# Patient Record
Sex: FEMALE | Race: BLACK OR AFRICAN AMERICAN | Employment: STUDENT | URBAN - METROPOLITAN AREA
[De-identification: names, ages, dates, MRNs, and addresses within clinical notes are randomized per-mention and may not be internally consistent; named-entity substitution may affect disease eponyms.]

---
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
## 2025-01-07 ENCOUNTER — HOSPITAL ENCOUNTER (OUTPATIENT)
Dept: INFUSION THERAPY | Age: 20
Discharge: HOME | End: 2025-01-07
Payer: COMMERCIAL

## 2025-01-07 ENCOUNTER — INITIAL CONSULT (OUTPATIENT)
Dept: ONCOLOGY | Age: 20
End: 2025-01-07

## 2025-01-07 VITALS
WEIGHT: 211.6 LBS | SYSTOLIC BLOOD PRESSURE: 121 MMHG | DIASTOLIC BLOOD PRESSURE: 74 MMHG | TEMPERATURE: 97.5 F | HEIGHT: 61 IN | HEART RATE: 89 BPM | OXYGEN SATURATION: 100 % | BODY MASS INDEX: 39.95 KG/M2

## 2025-01-07 DIAGNOSIS — D58.2 HEMOGLOBINOPATHY (HCC): ICD-10-CM

## 2025-01-07 DIAGNOSIS — D58.2 HEMOGLOBINOPATHY (HCC): Primary | ICD-10-CM

## 2025-01-07 LAB
BASOPHILS # BLD: 0.04 K/UL
BASOPHILS NFR BLD: 0 % (ref 0–1)
EOSINOPHIL # BLD: 0.13 K/UL
EOSINOPHILS RELATIVE PERCENT: 1 % (ref 0–3)
ERYTHROCYTE [DISTWIDTH] IN BLOOD BY AUTOMATED COUNT: 14 % (ref 11.7–14.9)
FERRITIN SERPL-MCNC: 15 NG/ML (ref 15–150)
FOLATE SERPL-MCNC: 15.7 NG/ML (ref 4.8–24.2)
HCT VFR BLD AUTO: 34.6 % (ref 37–47)
HGB BLD-MCNC: 11.8 G/DL (ref 12.5–16)
IRON SATN MFR SERPL: 13 % (ref 15–50)
IRON SERPL-MCNC: 51 UG/DL (ref 37–145)
LYMPHOCYTES NFR BLD: 1.9 K/UL
LYMPHOCYTES RELATIVE PERCENT: 17 % (ref 24–44)
MCH RBC QN AUTO: 29.1 PG (ref 27–31)
MCHC RBC AUTO-ENTMCNC: 34.1 G/DL (ref 32–36)
MCV RBC AUTO: 85.4 FL (ref 78–100)
MONOCYTES NFR BLD: 0.96 K/UL
MONOCYTES NFR BLD: 9 % (ref 0–4)
NEUTROPHILS NFR BLD: 72 % (ref 36–66)
NEUTS SEG NFR BLD: 7.87 K/UL
PLATELET # BLD AUTO: 256 K/UL (ref 140–440)
PMV BLD AUTO: 10.5 FL (ref 7.5–11.1)
RBC # BLD AUTO: 4.05 M/UL (ref 4.2–5.4)
TIBC SERPL-MCNC: 391 UG/DL (ref 260–445)
UNSATURATED IRON BINDING CAPACITY: 340 UG/DL (ref 110–370)
VIT B12 SERPL-MCNC: 314 PG/ML (ref 211–911)
WBC OTHER # BLD: 10.9 K/UL (ref 4–10.5)

## 2025-01-07 PROCEDURE — 85025 COMPLETE CBC W/AUTO DIFF WBC: CPT

## 2025-01-07 PROCEDURE — 83540 ASSAY OF IRON: CPT

## 2025-01-07 PROCEDURE — 82607 VITAMIN B-12: CPT

## 2025-01-07 PROCEDURE — 82728 ASSAY OF FERRITIN: CPT

## 2025-01-07 PROCEDURE — 83021 HEMOGLOBIN CHROMOTOGRAPHY: CPT

## 2025-01-07 PROCEDURE — 36415 COLL VENOUS BLD VENIPUNCTURE: CPT

## 2025-01-07 PROCEDURE — 85660 RBC SICKLE CELL TEST: CPT

## 2025-01-07 PROCEDURE — 99211 OFF/OP EST MAY X REQ PHY/QHP: CPT

## 2025-01-07 PROCEDURE — 82746 ASSAY OF FOLIC ACID SERUM: CPT

## 2025-01-07 PROCEDURE — 83550 IRON BINDING TEST: CPT

## 2025-01-07 ASSESSMENT — PATIENT HEALTH QUESTIONNAIRE - PHQ9
5. POOR APPETITE OR OVEREATING: NOT AT ALL
7. TROUBLE CONCENTRATING ON THINGS, SUCH AS READING THE NEWSPAPER OR WATCHING TELEVISION: NOT AT ALL
1. LITTLE INTEREST OR PLEASURE IN DOING THINGS: NOT AT ALL
4. FEELING TIRED OR HAVING LITTLE ENERGY: NOT AT ALL
SUM OF ALL RESPONSES TO PHQ QUESTIONS 1-9: 3
2. FEELING DOWN, DEPRESSED OR HOPELESS: NEARLY EVERY DAY
SUM OF ALL RESPONSES TO PHQ QUESTIONS 1-9: 3
SUM OF ALL RESPONSES TO PHQ9 QUESTIONS 1 & 2: 3
SUM OF ALL RESPONSES TO PHQ QUESTIONS 1-9: 3
SUM OF ALL RESPONSES TO PHQ QUESTIONS 1-9: 3
6. FEELING BAD ABOUT YOURSELF - OR THAT YOU ARE A FAILURE OR HAVE LET YOURSELF OR YOUR FAMILY DOWN: NOT AT ALL
10. IF YOU CHECKED OFF ANY PROBLEMS, HOW DIFFICULT HAVE THESE PROBLEMS MADE IT FOR YOU TO DO YOUR WORK, TAKE CARE OF THINGS AT HOME, OR GET ALONG WITH OTHER PEOPLE: NOT DIFFICULT AT ALL
9. THOUGHTS THAT YOU WOULD BE BETTER OFF DEAD, OR OF HURTING YOURSELF: NOT AT ALL
3. TROUBLE FALLING OR STAYING ASLEEP: NOT AT ALL
8. MOVING OR SPEAKING SO SLOWLY THAT OTHER PEOPLE COULD HAVE NOTICED. OR THE OPPOSITE, BEING SO FIGETY OR RESTLESS THAT YOU HAVE BEEN MOVING AROUND A LOT MORE THAN USUAL: NOT AT ALL

## 2025-01-07 NOTE — PROGRESS NOTES
MA Rooming Questions  Patient: Warren Briscoe  MRN: [id number]    Date: 1/7/2025        New Patient        5. Did the patient have a depression screening completed today? Yes    PHQ-9 Total Score: 3 (1/7/2025  2:33 PM)  Thoughts that you would be better off dead, or of hurting yourself in some way: 0 (1/7/2025  2:33 PM)       PHQ-9 Given to (if applicable):               PHQ-9 Score (if applicable):                     [] Positive     []  Negative              Does question #9 need addressed (if applicable)                     [] Yes    []  No               Enid Dukes MA

## 2025-01-11 LAB
HGB A1 MFR BLD: 55.5 % (ref 95–97.9)
HGB A2 MFR BLD: 3.3 % (ref 2–3.5)
HGB C MFR BLD: 0 % (ref 0–0)
HGB E MFR BLD: 0 % (ref 0–0)
HGB F MFR BLD: 1.1 % (ref 0–2.1)
HGB FRACT BLD ELPH-IMP: ABNORMAL
HGB OTHER MFR BLD: 0 % (ref 0–0)
HGB S BLD QL SOLY: POSITIVE
HGB S MFR BLD: 40.1 % (ref 0–0)
PATH INTERP BLD-IMP: ABNORMAL

## 2025-01-12 NOTE — PROGRESS NOTES
Patient Name:  Warren Briscoe  Patient :  2005  Patient MRN:  [id number]     Primary Oncologist: Jessica Trinh MD  Referring Provider: No primary care provider on file.     Date of Service: 2025       Chief Complaint:    No chief complaint on file.    FU visit.      There is no problem list on file for this patient.     HPI:   Luis Felipe HARRY is a female patient who was referred for abnormal Hg.  2024 HIV non reactive.   WBC 10, Hg 11.7, MCV 82.1, plat 306. HepBs Ab non reactive. Hep C not detected.   2024 WBC 9.5, Hg 11.7, MCV 87.5, plat 255.  Alpha thalassemia test negative.   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 Carriers of Sickle Cell Disease may be at risk for symptoms during physical exertion, high altitudes or when dehydrated. Comprehensive genetic counseling and additional medical workup as clinically indicated should be considered. If this individual's partner is a carrier for a Beta-Hemoglobinopathy or Beta-Thalassemia, their chance to have a child with this condition is 1 in 4 (25%). Carrier screening for this individual's partner is suggested.     Report boyfriend has been tested for sickle cell and he is negative. I recommend to ensure the boyfriend is also checked for thalassemia.     Currently pregnant. Due for delivery on 2025.   She has half brother, same mother.   No FH of cancer.     She has normocytic anemia. I order Hb electrophoresis, iron study and B-12 + folic acid.    2025 follow up visit.    2025 folate 15.7, B-12 314. WBC 10.9. Hg 11.8, MCV 85.4, plat 256.   ANC 7.87, Ferritin 15, TIBC 391, iron 51, sat 13.  HbA 55.5L, HbS 40.1, HbA1 3.3, consistent with sickle cell trait. .   Reports spouse negative for sickle cell disease.  Due date for delivery in 2025.  Advise to cont with prenatal vitamin and folic acid.    No acute pain.  Denies any

## 2025-01-14 ENCOUNTER — ROUTINE PRENATAL (OUTPATIENT)
Dept: OBGYN | Age: 20
End: 2025-01-14
Payer: COMMERCIAL

## 2025-01-14 VITALS
HEART RATE: 106 BPM | DIASTOLIC BLOOD PRESSURE: 61 MMHG | BODY MASS INDEX: 39.87 KG/M2 | WEIGHT: 211 LBS | SYSTOLIC BLOOD PRESSURE: 108 MMHG

## 2025-01-14 DIAGNOSIS — O09.93 SUPERVISION OF HIGH RISK PREGNANCY IN THIRD TRIMESTER: Primary | ICD-10-CM

## 2025-01-14 DIAGNOSIS — M54.9 BACK PAIN AFFECTING PREGNANCY IN THIRD TRIMESTER: ICD-10-CM

## 2025-01-14 DIAGNOSIS — K59.00 CONSTIPATION, UNSPECIFIED CONSTIPATION TYPE: ICD-10-CM

## 2025-01-14 DIAGNOSIS — O99.891 BACK PAIN AFFECTING PREGNANCY IN THIRD TRIMESTER: ICD-10-CM

## 2025-01-14 DIAGNOSIS — R10.2 PELVIC PAIN: ICD-10-CM

## 2025-01-14 DIAGNOSIS — R10.2 PELVIC PRESSURE IN FEMALE: ICD-10-CM

## 2025-01-14 PROCEDURE — 99213 OFFICE O/P EST LOW 20 MIN: CPT | Performed by: NURSE PRACTITIONER

## 2025-01-14 PROCEDURE — 1036F TOBACCO NON-USER: CPT | Performed by: NURSE PRACTITIONER

## 2025-01-14 PROCEDURE — G8427 DOCREV CUR MEDS BY ELIG CLIN: HCPCS | Performed by: NURSE PRACTITIONER

## 2025-01-14 PROCEDURE — G8417 CALC BMI ABV UP PARAM F/U: HCPCS | Performed by: NURSE PRACTITIONER

## 2025-01-14 SDOH — ECONOMIC STABILITY: FOOD INSECURITY: WITHIN THE PAST 12 MONTHS, THE FOOD YOU BOUGHT JUST DIDN'T LAST AND YOU DIDN'T HAVE MONEY TO GET MORE.: NEVER TRUE

## 2025-01-14 SDOH — ECONOMIC STABILITY: FOOD INSECURITY: WITHIN THE PAST 12 MONTHS, YOU WORRIED THAT YOUR FOOD WOULD RUN OUT BEFORE YOU GOT MONEY TO BUY MORE.: NEVER TRUE

## 2025-01-14 NOTE — PROGRESS NOTES
Return OB Office Visit    CC:   Chief Complaint   Patient presents with    Routine Prenatal Visit     Pt present for routine OB. Pt reports intense abdominal pain along w/ pressure in lower back, pt states lying down worsens sx. Pt advised white discharge, denies odor or dysuria. Pt also would like to have fetus re-measured for accuracy.  +fm, taking pnv.       HPI:  Patient seen and examined. Concerns/complaints: See above. Denies VB, LOF, ctx. +Fm.  Denies headaches, vision changes, RUQ pain, increased LE edema.  Denies chest pain, shortness of breath, fever, chills, nausea, vomiting.      Review of Systems: The following ROS was otherwise negative, except as noted in the HPI: constitutional, HEENT, respiratory, cardiovascular, gastrointestinal, genitourinary, skin, musculoskeletal, neurological, psych    Objective:  /61   Pulse (!) 106   Wt 95.7 kg (211 lb)   LMP 2024   BMI 39.87 kg/m²     Physical Exam  Vitals and nursing note reviewed.   Constitutional:       Appearance: Normal appearance. She is obese.   HENT:      Head: Normocephalic.   Pulmonary:      Effort: Pulmonary effort is normal.   Musculoskeletal:         General: Normal range of motion.      Cervical back: Normal range of motion.   Skin:     General: Skin is warm and dry.   Neurological:      Mental Status: She is alert.   Psychiatric:         Mood and Affect: Mood normal.         Gravid, non tender, no flank pain    FHR: + by doppler    Assessment/Plan:   Warren Briscoe is a 19 y.o.  at 28w4d who presents for routine OB visit     Diagnosis Orders   1. Supervision of high risk pregnancy in third trimester        2. Pelvic pressure in female        3. Pelvic pain        4. Back pain affecting pregnancy in third trimester        5. Constipation, unspecified constipation type          Safe medications for constipation discussed, encouraged to increase water/fiber intake    Declines flu/TDAP vaccines  All labs up to date and

## 2025-01-15 LAB — BACTERIA UR CULT: NORMAL

## 2025-01-20 ENCOUNTER — OFFICE VISIT (OUTPATIENT)
Dept: ONCOLOGY | Age: 20
End: 2025-01-20

## 2025-01-20 ENCOUNTER — HOSPITAL ENCOUNTER (OUTPATIENT)
Dept: INFUSION THERAPY | Age: 20
Discharge: HOME | End: 2025-01-20
Payer: COMMERCIAL

## 2025-01-20 VITALS
HEIGHT: 61 IN | DIASTOLIC BLOOD PRESSURE: 71 MMHG | BODY MASS INDEX: 40.33 KG/M2 | SYSTOLIC BLOOD PRESSURE: 120 MMHG | HEART RATE: 87 BPM | WEIGHT: 213.6 LBS | TEMPERATURE: 98 F | OXYGEN SATURATION: 100 %

## 2025-01-20 DIAGNOSIS — D58.2 HEMOGLOBINOPATHY (HCC): Primary | ICD-10-CM

## 2025-01-20 PROCEDURE — 99211 OFF/OP EST MAY X REQ PHY/QHP: CPT

## 2025-01-20 NOTE — PROGRESS NOTES
MA Rooming Questions  Patient: Warren Briscoe  MRN: [id number]    Date: 1/20/2025        1. Do you have any new issues?   no         2. Do you need any refills on medications?    no    3. Have you had any imaging done since your last visit?   no    4. Have you been hospitalized or seen in the emergency room since your last visit here?   no    5. Did the patient have a depression screening completed today? No    No data recorded     PHQ-9 Given to (if applicable):               PHQ-9 Score (if applicable):                     [] Positive     []  Negative              Does question #9 need addressed (if applicable)                     [] Yes    []  No               Courtney Carcamo [facility]

## 2025-01-28 ENCOUNTER — ROUTINE PRENATAL (OUTPATIENT)
Dept: OBGYN | Age: 20
End: 2025-01-28
Payer: COMMERCIAL

## 2025-01-28 VITALS
SYSTOLIC BLOOD PRESSURE: 87 MMHG | DIASTOLIC BLOOD PRESSURE: 57 MMHG | BODY MASS INDEX: 40.4 KG/M2 | WEIGHT: 214 LBS | HEART RATE: 107 BPM | HEIGHT: 61 IN

## 2025-01-28 DIAGNOSIS — O99.213 OBESITY AFFECTING PREGNANCY IN THIRD TRIMESTER, UNSPECIFIED OBESITY TYPE: ICD-10-CM

## 2025-01-28 DIAGNOSIS — O09.93 SUPERVISION OF HIGH RISK PREGNANCY IN THIRD TRIMESTER: Primary | ICD-10-CM

## 2025-01-28 DIAGNOSIS — Z3A.30 30 WEEKS GESTATION OF PREGNANCY: ICD-10-CM

## 2025-01-28 PROCEDURE — G8417 CALC BMI ABV UP PARAM F/U: HCPCS | Performed by: OBSTETRICS & GYNECOLOGY

## 2025-01-28 PROCEDURE — G8427 DOCREV CUR MEDS BY ELIG CLIN: HCPCS | Performed by: OBSTETRICS & GYNECOLOGY

## 2025-01-28 PROCEDURE — 1036F TOBACCO NON-USER: CPT | Performed by: OBSTETRICS & GYNECOLOGY

## 2025-01-28 PROCEDURE — 99213 OFFICE O/P EST LOW 20 MIN: CPT | Performed by: OBSTETRICS & GYNECOLOGY

## 2025-01-28 NOTE — PROGRESS NOTES
Return OB Office Visit    CC:   Chief Complaint   Patient presents with    Routine Prenatal Visit     Taking pnv and flexeril. +fm. Pt has questions regarding size of fetus.        HPI:  Patient seen and examined. No concerns/complaints. Denies VB, LOF, ctx. +Fm.  Denies headaches, vision changes, RUQ pain, increased LE edema.  Denies chest pain, shortness of breath, fever, chills, nausea, vomiting.      Review of Systems: The following ROS was otherwise negative, except as noted in the HPI: constitutional, HEENT, respiratory, cardiovascular, gastrointestinal, genitourinary, skin, musculoskeletal, neurological, psych    Objective:  BP (!) 87/57   Pulse (!) 107   Ht 1.549 m (5' 1\")   Wt 97.1 kg (214 lb)   LMP 2024   BMI 40.43 kg/m²     Gravid, non tender, no flank pain    FHR: +by doppler    Assessment/Plan:   Warren Briscoe is a 19 y.o.  at 30w4d who presents for routine OB visit     Diagnosis Orders   1. Supervision of high risk pregnancy in third trimester        2. Obesity affecting pregnancy in third trimester, unspecified obesity type        3. 30 weeks gestation of pregnancy            Return in about 2 weeks (around 2025).    All OB labs and imaging reviewed  Vitamins for the duration of pregnancy      Bob Camacho MD

## 2025-02-11 ENCOUNTER — ROUTINE PRENATAL (OUTPATIENT)
Dept: OBGYN | Age: 20
End: 2025-02-11
Payer: COMMERCIAL

## 2025-02-11 VITALS
BODY MASS INDEX: 41.76 KG/M2 | SYSTOLIC BLOOD PRESSURE: 113 MMHG | HEART RATE: 82 BPM | DIASTOLIC BLOOD PRESSURE: 75 MMHG | WEIGHT: 221 LBS

## 2025-02-11 DIAGNOSIS — Z3A.32 32 WEEKS GESTATION OF PREGNANCY: Primary | ICD-10-CM

## 2025-02-11 DIAGNOSIS — O99.210 MATERNAL OBESITY, ANTEPARTUM: ICD-10-CM

## 2025-02-11 DIAGNOSIS — O26.893 LOW BACK PAIN DURING PREGNANCY IN THIRD TRIMESTER: ICD-10-CM

## 2025-02-11 DIAGNOSIS — O09.93 SUPERVISION OF HIGH RISK PREGNANCY IN THIRD TRIMESTER: ICD-10-CM

## 2025-02-11 DIAGNOSIS — M54.50 LOW BACK PAIN DURING PREGNANCY IN THIRD TRIMESTER: ICD-10-CM

## 2025-02-11 PROCEDURE — 1036F TOBACCO NON-USER: CPT

## 2025-02-11 PROCEDURE — G8427 DOCREV CUR MEDS BY ELIG CLIN: HCPCS

## 2025-02-11 PROCEDURE — G8417 CALC BMI ABV UP PARAM F/U: HCPCS

## 2025-02-11 PROCEDURE — 99214 OFFICE O/P EST MOD 30 MIN: CPT

## 2025-02-11 RX ORDER — LIDOCAINE 4 G/G
1 PATCH TOPICAL DAILY
Qty: 30 PATCH | Refills: 0 | Status: SHIPPED | OUTPATIENT
Start: 2025-02-11 | End: 2025-03-13

## 2025-02-11 NOTE — PROGRESS NOTES
Return OB Office Visit    CC:   Chief Complaint   Patient presents with    Routine Prenatal Visit     Pt c/o lower back pain radiating down to legs, pelvic pressure and cramps w/ white discharge. Denies odor and blood. +fm ,taking pnv.        HPI:  Patient seen and examined. C/o low back pain as above. C/o mild menstrual like cramps. Denies VB, LOF, ctx. +Fm.  C/o headaches, but they improve with hydration and tylenol. Denies vision changes, RUQ pain, increased LE edema.  Denies chest pain, shortness of breath, fever, chills, nausea, vomiting.      Review of Systems: The following ROS was otherwise negative, except as noted in the HPI: constitutional, HEENT, respiratory, cardiovascular, gastrointestinal, genitourinary, skin, musculoskeletal, neurological, psych    Objective:  /75   Pulse 82   Wt 100.2 kg (221 lb)   LMP 2024   BMI 41.76 kg/m²     Physical Exam  Vitals and nursing note reviewed.   Constitutional:       Appearance: Normal appearance.   HENT:      Head: Normocephalic.   Pulmonary:      Effort: Pulmonary effort is normal.   Musculoskeletal:         General: Normal range of motion.      Cervical back: Normal range of motion.   Skin:     General: Skin is warm and dry.   Neurological:      Mental Status: She is alert.   Psychiatric:         Mood and Affect: Mood normal.       Gravid, non tender, no flank pain    FHR: + by doppler    Assessment/Plan:   Warren Briscoe is a 19 y.o.  at 32w4d who presents for routine OB visit     Diagnosis Orders   1. 32 weeks gestation of pregnancy        2. Supervision of high risk pregnancy in third trimester  US PREG UTERUS FOLLOW UP TRANSABD PER FETUS      3. Maternal obesity, antepartum        4. Low back pain during pregnancy in third trimester  lidocaine 4 % external patch        Fundal height 34 today, will follow up with ultrasound. 1 hour gtt normal    Continue PNV. Fetal kick counts reviewed. PTL s/s reviewed. To L&D for decreased fm, gush of

## 2025-02-25 ENCOUNTER — ROUTINE PRENATAL (OUTPATIENT)
Dept: OBGYN | Age: 20
End: 2025-02-25
Payer: COMMERCIAL

## 2025-02-25 VITALS
WEIGHT: 234 LBS | DIASTOLIC BLOOD PRESSURE: 72 MMHG | BODY MASS INDEX: 44.21 KG/M2 | SYSTOLIC BLOOD PRESSURE: 119 MMHG | HEART RATE: 75 BPM

## 2025-02-25 DIAGNOSIS — O09.93 SUPERVISION OF HIGH RISK PREGNANCY IN THIRD TRIMESTER: Primary | ICD-10-CM

## 2025-02-25 DIAGNOSIS — Z3A.34 34 WEEKS GESTATION OF PREGNANCY: ICD-10-CM

## 2025-02-25 DIAGNOSIS — O26.893 PREGNANCY HEADACHE IN THIRD TRIMESTER: ICD-10-CM

## 2025-02-25 DIAGNOSIS — F41.9 ANXIETY AND DEPRESSION: ICD-10-CM

## 2025-02-25 DIAGNOSIS — F32.A ANXIETY AND DEPRESSION: ICD-10-CM

## 2025-02-25 DIAGNOSIS — R60.0 LOWER EXTREMITY EDEMA: ICD-10-CM

## 2025-02-25 DIAGNOSIS — R42 DIZZINESS: ICD-10-CM

## 2025-02-25 DIAGNOSIS — R51.9 PREGNANCY HEADACHE IN THIRD TRIMESTER: ICD-10-CM

## 2025-02-25 DIAGNOSIS — O99.210 OBESITY IN PREGNANCY: ICD-10-CM

## 2025-02-25 PROCEDURE — 36415 COLL VENOUS BLD VENIPUNCTURE: CPT | Performed by: STUDENT IN AN ORGANIZED HEALTH CARE EDUCATION/TRAINING PROGRAM

## 2025-02-25 PROCEDURE — 99213 OFFICE O/P EST LOW 20 MIN: CPT | Performed by: STUDENT IN AN ORGANIZED HEALTH CARE EDUCATION/TRAINING PROGRAM

## 2025-02-25 PROCEDURE — G8417 CALC BMI ABV UP PARAM F/U: HCPCS | Performed by: STUDENT IN AN ORGANIZED HEALTH CARE EDUCATION/TRAINING PROGRAM

## 2025-02-25 PROCEDURE — G8427 DOCREV CUR MEDS BY ELIG CLIN: HCPCS | Performed by: STUDENT IN AN ORGANIZED HEALTH CARE EDUCATION/TRAINING PROGRAM

## 2025-02-25 PROCEDURE — 1036F TOBACCO NON-USER: CPT | Performed by: STUDENT IN AN ORGANIZED HEALTH CARE EDUCATION/TRAINING PROGRAM

## 2025-02-25 NOTE — PROGRESS NOTES
Department of Obstetrics and Gynecology  Routine Prenatal Office Visit  Name:  Warren Briscoe   CSN: [id number]    : 2005   Age: 19 y.o.        Chief Complaint   Patient presents with    Routine Prenatal Visit     +fm, taking pnv  C/o LE edema with painful ambulation, dizziness accompanied with headaches and spotty / blurry vision       EDC: Estimated Date of Delivery: 25     Warren Briscoe is a 19 y.o.  at 34w4d     Subjective : Patient reports lower extremity edema. Notes dizziness and headaches with spotty vision.     + Fetal movement.     Negative headache, negative vision changes, negative right upper quadrant pain, negative edema, positive fetal movement, negative contractions, negative vaginal bleeding, negative leakage of fluid. Pt denies nausea/vomiting and calf pain.    Objective :    /72   Pulse 75   Wt 106.1 kg (234 lb)   LMP 2024   BMI 44.21 kg/m²         Physical Exam:  General Appearance: Alert and oriented. No acute distress  Gastrointestinal: Soft. Non-tender, non-distended. Gravid uterus.  Musculoskeletal: No significant lower extremity edema.    Fundal Height: 35 cm  Fetal Heart Tones: 154 bpm    ASSESSMENT/PLAN:     1. Warren Briscoe is a 19 y.o.  at 34w4d     Diagnosis Orders   1. Supervision of high risk pregnancy in third trimester        2. 34 weeks gestation of pregnancy        3. Obesity in pregnancy        4. Anxiety and depression        5. Dizziness        6. Lower extremity edema  Protein / creatinine ratio, urine    Comprehensive Metabolic Panel    CBC with Auto Differential      7. Pregnancy headache in third trimester  Protein / creatinine ratio, urine    Comprehensive Metabolic Panel    CBC with Auto Differential           All OB labs and imaging reviewed  Recommend continuing and taking daily prenatal vitamin  Tylenol for episodic pain relief as needed  GBS next visit  BP normotensive today, will get labs. Recommend compression

## 2025-02-26 LAB
ALBUMIN SERPL-MCNC: 3.2 G/DL (ref 3.4–5)
ALBUMIN/GLOB SERPL: 1.3 {RATIO} (ref 1.1–2.2)
ALP SERPL-CCNC: 103 U/L (ref 40–129)
ALT SERPL-CCNC: 9 U/L (ref 10–40)
ANION GAP SERPL CALCULATED.3IONS-SCNC: 9 MMOL/L (ref 3–16)
AST SERPL-CCNC: 15 U/L (ref 15–37)
BASOPHILS # BLD: 0.1 K/UL (ref 0–0.2)
BASOPHILS NFR BLD: 0.5 %
BILIRUB SERPL-MCNC: 0.5 MG/DL (ref 0–1)
BUN SERPL-MCNC: 3 MG/DL (ref 7–20)
CALCIUM SERPL-MCNC: 9 MG/DL (ref 8.3–10.6)
CHLORIDE SERPL-SCNC: 108 MMOL/L (ref 99–110)
CO2 SERPL-SCNC: 23 MMOL/L (ref 21–32)
CREAT SERPL-MCNC: 0.6 MG/DL (ref 0.6–1.1)
CREAT UR-MCNC: 105 MG/DL (ref 28–259)
DEPRECATED RDW RBC AUTO: 14.4 % (ref 12.4–15.4)
EOSINOPHIL # BLD: 0.1 K/UL (ref 0–0.6)
EOSINOPHIL NFR BLD: 1.4 %
GFR SERPLBLD CREATININE-BSD FMLA CKD-EPI: >90 ML/MIN/{1.73_M2}
GLUCOSE SERPL-MCNC: 66 MG/DL (ref 70–99)
HCT VFR BLD AUTO: 36.4 % (ref 36–48)
HGB BLD-MCNC: 12.3 G/DL (ref 12–16)
LYMPHOCYTES # BLD: 1.7 K/UL (ref 1–5.1)
LYMPHOCYTES NFR BLD: 16.5 %
MCH RBC QN AUTO: 28.5 PG (ref 26–34)
MCHC RBC AUTO-ENTMCNC: 33.9 G/DL (ref 31–36)
MCV RBC AUTO: 84.2 FL (ref 80–100)
MONOCYTES # BLD: 0.8 K/UL (ref 0–1.3)
MONOCYTES NFR BLD: 8 %
NEUTROPHILS # BLD: 7.7 K/UL (ref 1.7–7.7)
NEUTROPHILS NFR BLD: 73.6 %
PLATELET # BLD AUTO: 251 K/UL (ref 135–450)
PMV BLD AUTO: 10.3 FL (ref 5–10.5)
POTASSIUM SERPL-SCNC: 4.6 MMOL/L (ref 3.5–5.1)
PROT SERPL-MCNC: 5.6 G/DL (ref 6.4–8.2)
PROT UR-MCNC: 10.9 MG/DL
PROT/CREAT UR-RTO: 0.1 MG/DL
RBC # BLD AUTO: 4.32 M/UL (ref 4–5.2)
SODIUM SERPL-SCNC: 140 MMOL/L (ref 136–145)
WBC # BLD AUTO: 10.4 K/UL (ref 4–11)

## 2025-03-06 ENCOUNTER — HOSPITAL ENCOUNTER (OUTPATIENT)
Age: 20
Discharge: HOME | End: 2025-03-06
Attending: OBSTETRICS & GYNECOLOGY | Admitting: OBSTETRICS & GYNECOLOGY
Payer: COMMERCIAL

## 2025-03-06 VITALS
HEIGHT: 61 IN | BODY MASS INDEX: 44.37 KG/M2 | WEIGHT: 235 LBS | RESPIRATION RATE: 15 BRPM | TEMPERATURE: 98.1 F | OXYGEN SATURATION: 100 % | SYSTOLIC BLOOD PRESSURE: 120 MMHG | DIASTOLIC BLOOD PRESSURE: 74 MMHG | HEART RATE: 74 BPM

## 2025-03-06 PROBLEM — O36.8190 DECREASED FETAL MOVEMENT AFFECTING MANAGEMENT OF MOTHER, ANTEPARTUM: Status: ACTIVE | Noted: 2025-03-06

## 2025-03-06 PROCEDURE — 99212 OFFICE O/P EST SF 10 MIN: CPT

## 2025-03-06 PROCEDURE — APPNB30 APP NON BILLABLE TIME 0-30 MINS: Performed by: NURSE PRACTITIONER

## 2025-03-06 NOTE — FLOWSHEET NOTE
Patient arrives ambulatory to the birthing center with complaints of decreased fetal movement. Patient escorted to [facility], instructed to change into gown, obtain a urine specimen, and oriented to room and call light.

## 2025-03-06 NOTE — PROGRESS NOTES
Department of Obstetrics and Gynecology  Labor and Delivery  TRIAGE NOTE      SUBJECTIVE:    Chief Complaint   Patient presents with    Decreased Fetal Movement     Pt presents to L and D with complaints of decreased fetal movement hasn't felt baby move for \" 3 days \".  States \" if it does move it is small\".  No other concerns, denies LOF or bleeding.      OBJECTIVE    Vitals:  /86   Pulse 72   Resp 16   Ht 1.549 m (5' 1\")   Wt 106.6 kg (235 lb)   LMP 2024   SpO2 99%   BMI 44.40 kg/m²       CONSTITUTIONAL:  negative  RESPIRATORY:  negative  CARDIOVASCULAR:  negative  GASTROINTESTINAL:  negative  ALLERGIC/IMMUNOLOGIC:  negative  NEUROLOGICAL:  negative  BEHAVIOR/PSYCH:  negative      Fetal heart rate:        Baseline FHR :    130 bpm              Fetal Accelerations:  present        Fetal Decelerations:  absent        Fetal Variability:   moderate    Contraction frequency:  0 minutes      DATA:  Lab Results   Component Value Date    WBC 10.4 2025    HGB 12.3 2025    HCT 36.4 2025    MCV 84.2 2025     2025         ASSESSMENT:     19 y.o.  year old  at 35w6d  with 2025, by Last Menstrual Period  FHT CAT 1 , reactive NST         PLAN:  Kick counts discussed , reviewed when to call with concern   Kepp follow up OB appt as scheduled     I have collaborated and updated Dr Camacho  and the MD agrees with the current POC.       EDER Moreno CNM

## 2025-03-06 NOTE — FLOWSHEET NOTE
EFM and TOCO applied.     Patient reports that she has not felt baby move for 3 days. Patient denies loss of fluids, denies vaginal bleeding, and denies contractions. Patient's abdomen is soft and non tender.

## 2025-03-11 ENCOUNTER — ROUTINE PRENATAL (OUTPATIENT)
Dept: OBGYN | Age: 20
End: 2025-03-11
Payer: COMMERCIAL

## 2025-03-11 VITALS
DIASTOLIC BLOOD PRESSURE: 84 MMHG | BODY MASS INDEX: 44.97 KG/M2 | HEART RATE: 81 BPM | WEIGHT: 238 LBS | SYSTOLIC BLOOD PRESSURE: 123 MMHG

## 2025-03-11 DIAGNOSIS — Z3A.36 36 WEEKS GESTATION OF PREGNANCY: Primary | ICD-10-CM

## 2025-03-11 DIAGNOSIS — F32.A ANXIETY AND DEPRESSION: ICD-10-CM

## 2025-03-11 DIAGNOSIS — Z34.03 ENCOUNTER FOR SUPERVISION OF NORMAL FIRST PREGNANCY IN THIRD TRIMESTER: ICD-10-CM

## 2025-03-11 DIAGNOSIS — F41.9 ANXIETY AND DEPRESSION: ICD-10-CM

## 2025-03-11 DIAGNOSIS — O99.210 OBESITY IN PREGNANCY: ICD-10-CM

## 2025-03-11 PROBLEM — O36.8190 DECREASED FETAL MOVEMENT AFFECTING MANAGEMENT OF MOTHER, ANTEPARTUM: Status: RESOLVED | Noted: 2025-03-06 | Resolved: 2025-03-11

## 2025-03-11 PROCEDURE — G8417 CALC BMI ABV UP PARAM F/U: HCPCS | Performed by: STUDENT IN AN ORGANIZED HEALTH CARE EDUCATION/TRAINING PROGRAM

## 2025-03-11 PROCEDURE — 1036F TOBACCO NON-USER: CPT | Performed by: STUDENT IN AN ORGANIZED HEALTH CARE EDUCATION/TRAINING PROGRAM

## 2025-03-11 PROCEDURE — 99213 OFFICE O/P EST LOW 20 MIN: CPT | Performed by: STUDENT IN AN ORGANIZED HEALTH CARE EDUCATION/TRAINING PROGRAM

## 2025-03-11 PROCEDURE — G8427 DOCREV CUR MEDS BY ELIG CLIN: HCPCS | Performed by: STUDENT IN AN ORGANIZED HEALTH CARE EDUCATION/TRAINING PROGRAM

## 2025-03-11 NOTE — PROGRESS NOTES
Department of Obstetrics and Gynecology  Routine Prenatal Office Visit  Name:  Warren Briscoe   CSN: [id number]    : 2005   Age: 19 y.o.        Chief Complaint   Patient presents with    Routine Prenatal Visit     GBS today, no c/o. +fm, taking pnv.        EDC: Estimated Date of Delivery: 25     Warren Briscoe is a 19 y.o.  at 36w4d     Subjective : Patient doing well without complaints.    + Fetal movement.     Negative headache, negative vision changes, negative right upper quadrant pain, negative edema, positive fetal movement, negative contractions, negative vaginal bleeding, negative leakage of fluid. Pt denies nausea/vomiting and calf pain.    Objective :    /84   Pulse 81   Wt 108 kg (238 lb)   LMP 2024   BMI 44.97 kg/m²         Physical Exam:  General Appearance: Alert and oriented. No acute distress  Gastrointestinal: Soft. Non-tender, non-distended. Gravid uterus.  Musculoskeletal: No significant lower extremity edema.    Fundal Height: 36 cm  Fetal Heart Tones: 145 bpm    Chaperone: Mine Cordon MA present for exam    ASSESSMENT/PLAN:     1. Warren Briscoe is a 19 y.o.  at 36w4d     Diagnosis Orders   1. 36 weeks gestation of pregnancy  Culture, Strep B Screen, Vaginal/Rectal      2. Encounter for supervision of normal first pregnancy in third trimester  Culture, Strep B Screen, Vaginal/Rectal      3. Obesity in pregnancy        4. Anxiety and depression        5. Breech presentation, single or unspecified fetus  US OB 1 OR MORE FETUS LIMITED           All OB labs and imaging reviewed  Recommend continuing and taking daily prenatal vitamin  Tylenol for episodic pain relief as needed  GBS today  Breech on prior US, repeat scan ordered to check position in 1w    Return to the office in 1 week    Electronically signed by: Meryl Arceo DO 3/11/2025

## 2025-03-14 LAB — GP B STREP SPEC QL CULT: NORMAL

## 2025-03-17 ENCOUNTER — ANESTHESIA EVENT (OUTPATIENT)
Dept: LABOR AND DELIVERY | Age: 20
DRG: 540 | End: 2025-03-17
Payer: COMMERCIAL

## 2025-03-17 ENCOUNTER — ANESTHESIA (OUTPATIENT)
Dept: LABOR AND DELIVERY | Age: 20
DRG: 540 | End: 2025-03-17
Payer: COMMERCIAL

## 2025-03-17 ENCOUNTER — HOSPITAL ENCOUNTER (INPATIENT)
Age: 20
LOS: 3 days | Discharge: HOME | DRG: 540 | End: 2025-03-20
Attending: OBSTETRICS & GYNECOLOGY | Admitting: STUDENT IN AN ORGANIZED HEALTH CARE EDUCATION/TRAINING PROGRAM
Payer: COMMERCIAL

## 2025-03-17 PROBLEM — Z3A.37 37 WEEKS GESTATION OF PREGNANCY: Status: ACTIVE | Noted: 2025-03-17

## 2025-03-17 PROBLEM — O42.90 AMNIOTIC FLUID LEAKING: Status: ACTIVE | Noted: 2025-03-17

## 2025-03-17 LAB
ABO + RH BLD: NORMAL
ALBUMIN SERPL-MCNC: 2.8 G/DL (ref 3.4–5)
ALBUMIN/GLOB SERPL: 1.3 {RATIO} (ref 1.1–2.2)
ALP SERPL-CCNC: 113 U/L (ref 40–129)
ALT SERPL-CCNC: 6 U/L (ref 10–40)
AMPHET UR QL SCN: NEGATIVE
ANION GAP SERPL CALCULATED.3IONS-SCNC: 9 MMOL/L (ref 9–17)
AST SERPL-CCNC: 13 U/L (ref 15–37)
BARBITURATES UR QL SCN: NEGATIVE
BENZODIAZ UR QL: NEGATIVE
BILIRUB SERPL-MCNC: 0.4 MG/DL (ref 0–1)
BILIRUB UR QL STRIP: NEGATIVE
BLOOD BANK SAMPLE EXPIRATION: NORMAL
BLOOD GROUP ANTIBODIES SERPL: NEGATIVE
BUN SERPL-MCNC: 6 MG/DL (ref 7–20)
CALCIUM SERPL-MCNC: 8.4 MG/DL (ref 8.3–10.6)
CANNABINOIDS UR QL SCN: NEGATIVE
CHLORIDE SERPL-SCNC: 111 MMOL/L (ref 99–110)
CLARITY UR: CLEAR
CO2 SERPL-SCNC: 19 MMOL/L (ref 21–32)
COCAINE UR QL SCN: NEGATIVE
COLOR UR: YELLOW
CREAT SERPL-MCNC: 0.6 MG/DL (ref 0.6–1.1)
CREAT UR-MCNC: 123 MG/DL (ref 28–217)
EPI CELLS #/AREA URNS HPF: 14 /HPF
ERYTHROCYTE [DISTWIDTH] IN BLOOD BY AUTOMATED COUNT: 14 % (ref 11.7–14.9)
FENTANYL UR QL: NEGATIVE
GFR, ESTIMATED: >90 ML/MIN/1.73M2
GLUCOSE SERPL-MCNC: 72 MG/DL (ref 74–99)
GLUCOSE UR STRIP-MCNC: NEGATIVE MG/DL
HCT VFR BLD AUTO: 35.8 % (ref 37–47)
HGB BLD-MCNC: 11.6 G/DL (ref 12.5–16)
HGB UR QL STRIP.AUTO: ABNORMAL
KETONES UR STRIP-MCNC: NEGATIVE MG/DL
LEUKOCYTE ESTERASE UR QL STRIP: NEGATIVE
MCH RBC QN AUTO: 27.6 PG (ref 27–31)
MCHC RBC AUTO-ENTMCNC: 32.4 G/DL (ref 32–36)
MCV RBC AUTO: 85 FL (ref 78–100)
NITRITE UR QL STRIP: NEGATIVE
OPIATES UR QL SCN: NEGATIVE
OXYCODONE UR QL SCN: NEGATIVE
PH UR STRIP: 7 [PH] (ref 5–8)
PLATELET # BLD AUTO: 210 K/UL (ref 140–440)
PMV BLD AUTO: 11.7 FL (ref 7.5–11.1)
POTASSIUM SERPL-SCNC: 4.6 MMOL/L (ref 3.5–5.1)
PROT SERPL-MCNC: 5.1 G/DL (ref 6.4–8.2)
PROT UR STRIP-MCNC: ABNORMAL MG/DL
RBC # BLD AUTO: 4.21 M/UL (ref 4.2–5.4)
RBC #/AREA URNS HPF: 52 /HPF (ref 0–2)
SODIUM SERPL-SCNC: 140 MMOL/L (ref 136–145)
SP GR UR STRIP: 1.01 (ref 1–1.03)
T PALLIDUM AB SER QL IA: NONREACTIVE
TEST INFORMATION: NORMAL
TOTAL PROTEIN, URINE: 47 MG/DL
URINE TOTAL PROTEIN CREATININE RATIO: 0.38 (ref 0–0.2)
UROBILINOGEN UR STRIP-ACNC: 0.2 EU/DL (ref 0–1)
WBC #/AREA URNS HPF: 5 /HPF (ref 0–5)
WBC OTHER # BLD: 9 K/UL (ref 4–10.5)

## 2025-03-17 PROCEDURE — 59514 CESAREAN DELIVERY ONLY: CPT | Performed by: STUDENT IN AN ORGANIZED HEALTH CARE EDUCATION/TRAINING PROGRAM

## 2025-03-17 PROCEDURE — 86900 BLOOD TYPING SEROLOGIC ABO: CPT

## 2025-03-17 PROCEDURE — 2709999900 HC NON-CHARGEABLE SUPPLY: Performed by: STUDENT IN AN ORGANIZED HEALTH CARE EDUCATION/TRAINING PROGRAM

## 2025-03-17 PROCEDURE — 86780 TREPONEMA PALLIDUM: CPT

## 2025-03-17 PROCEDURE — APPNB30 APP NON BILLABLE TIME 0-30 MINS: Performed by: ADVANCED PRACTICE MIDWIFE

## 2025-03-17 PROCEDURE — 6360000002 HC RX W HCPCS: Performed by: ADVANCED PRACTICE MIDWIFE

## 2025-03-17 PROCEDURE — 84156 ASSAY OF PROTEIN URINE: CPT

## 2025-03-17 PROCEDURE — 7100000000 HC PACU RECOVERY - FIRST 15 MIN: Performed by: STUDENT IN AN ORGANIZED HEALTH CARE EDUCATION/TRAINING PROGRAM

## 2025-03-17 PROCEDURE — 86901 BLOOD TYPING SEROLOGIC RH(D): CPT

## 2025-03-17 PROCEDURE — 2500000003 HC RX 250 WO HCPCS: Performed by: ADVANCED PRACTICE MIDWIFE

## 2025-03-17 PROCEDURE — 7100000001 HC PACU RECOVERY - ADDTL 15 MIN: Performed by: STUDENT IN AN ORGANIZED HEALTH CARE EDUCATION/TRAINING PROGRAM

## 2025-03-17 PROCEDURE — 81001 URINALYSIS AUTO W/SCOPE: CPT

## 2025-03-17 PROCEDURE — 36415 COLL VENOUS BLD VENIPUNCTURE: CPT

## 2025-03-17 PROCEDURE — 2580000003 HC RX 258: Performed by: ADVANCED PRACTICE MIDWIFE

## 2025-03-17 PROCEDURE — 82570 ASSAY OF URINE CREATININE: CPT

## 2025-03-17 PROCEDURE — 86850 RBC ANTIBODY SCREEN: CPT

## 2025-03-17 PROCEDURE — 80307 DRUG TEST PRSMV CHEM ANLYZR: CPT

## 2025-03-17 PROCEDURE — 1220000000 HC SEMI PRIVATE OB R&B

## 2025-03-17 PROCEDURE — 3700000001 HC ADD 15 MINUTES (ANESTHESIA): Performed by: STUDENT IN AN ORGANIZED HEALTH CARE EDUCATION/TRAINING PROGRAM

## 2025-03-17 PROCEDURE — 6360000002 HC RX W HCPCS: Performed by: NURSE ANESTHETIST, CERTIFIED REGISTERED

## 2025-03-17 PROCEDURE — 6360000002 HC RX W HCPCS: Performed by: STUDENT IN AN ORGANIZED HEALTH CARE EDUCATION/TRAINING PROGRAM

## 2025-03-17 PROCEDURE — 80053 COMPREHEN METABOLIC PANEL: CPT

## 2025-03-17 PROCEDURE — 3609079900 HC CESAREAN SECTION: Performed by: STUDENT IN AN ORGANIZED HEALTH CARE EDUCATION/TRAINING PROGRAM

## 2025-03-17 PROCEDURE — 6370000000 HC RX 637 (ALT 250 FOR IP): Performed by: ADVANCED PRACTICE MIDWIFE

## 2025-03-17 PROCEDURE — 3700000000 HC ANESTHESIA ATTENDED CARE: Performed by: STUDENT IN AN ORGANIZED HEALTH CARE EDUCATION/TRAINING PROGRAM

## 2025-03-17 PROCEDURE — 85027 COMPLETE CBC AUTOMATED: CPT

## 2025-03-17 RX ORDER — SODIUM CHLORIDE, SODIUM LACTATE, POTASSIUM CHLORIDE, CALCIUM CHLORIDE 600; 310; 30; 20 MG/100ML; MG/100ML; MG/100ML; MG/100ML
INJECTION, SOLUTION INTRAVENOUS CONTINUOUS
Status: DISCONTINUED | OUTPATIENT
Start: 2025-03-17 | End: 2025-03-17 | Stop reason: SDUPTHER

## 2025-03-17 RX ORDER — SODIUM CHLORIDE 0.9 % (FLUSH) 0.9 %
10 SYRINGE (ML) INJECTION EVERY 12 HOURS SCHEDULED
Status: DISCONTINUED | OUTPATIENT
Start: 2025-03-17 | End: 2025-03-17

## 2025-03-17 RX ORDER — FAMOTIDINE 10 MG/ML
20 INJECTION, SOLUTION INTRAVENOUS 2 TIMES DAILY PRN
Status: DISCONTINUED | OUTPATIENT
Start: 2025-03-17 | End: 2025-03-17

## 2025-03-17 RX ORDER — NALOXONE HYDROCHLORIDE 0.4 MG/ML
INJECTION, SOLUTION INTRAMUSCULAR; INTRAVENOUS; SUBCUTANEOUS PRN
Status: ACTIVE | OUTPATIENT
Start: 2025-03-17 | End: 2025-03-18

## 2025-03-17 RX ORDER — SODIUM CHLORIDE, SODIUM LACTATE, POTASSIUM CHLORIDE, CALCIUM CHLORIDE 600; 310; 30; 20 MG/100ML; MG/100ML; MG/100ML; MG/100ML
INJECTION, SOLUTION INTRAVENOUS CONTINUOUS
Status: DISCONTINUED | OUTPATIENT
Start: 2025-03-17 | End: 2025-03-17

## 2025-03-17 RX ORDER — ONDANSETRON 2 MG/ML
4 INJECTION INTRAMUSCULAR; INTRAVENOUS EVERY 6 HOURS PRN
Status: DISCONTINUED | OUTPATIENT
Start: 2025-03-17 | End: 2025-03-17

## 2025-03-17 RX ORDER — FENTANYL CITRATE 50 UG/ML
100 INJECTION, SOLUTION INTRAMUSCULAR; INTRAVENOUS
Status: DISCONTINUED | OUTPATIENT
Start: 2025-03-17 | End: 2025-03-17

## 2025-03-17 RX ORDER — SODIUM CHLORIDE 9 MG/ML
INJECTION, SOLUTION INTRAVENOUS PRN
Status: DISCONTINUED | OUTPATIENT
Start: 2025-03-17 | End: 2025-03-17

## 2025-03-17 RX ORDER — SODIUM CHLORIDE 0.9 % (FLUSH) 0.9 %
5-40 SYRINGE (ML) INJECTION PRN
Status: DISCONTINUED | OUTPATIENT
Start: 2025-03-17 | End: 2025-03-20 | Stop reason: HOSPADM

## 2025-03-17 RX ORDER — DOCUSATE SODIUM 100 MG/1
100 CAPSULE, LIQUID FILLED ORAL 2 TIMES DAILY
Status: DISCONTINUED | OUTPATIENT
Start: 2025-03-17 | End: 2025-03-20 | Stop reason: HOSPADM

## 2025-03-17 RX ORDER — ONDANSETRON 2 MG/ML
4 INJECTION INTRAMUSCULAR; INTRAVENOUS EVERY 6 HOURS PRN
Status: DISCONTINUED | OUTPATIENT
Start: 2025-03-17 | End: 2025-03-20 | Stop reason: HOSPADM

## 2025-03-17 RX ORDER — FENTANYL CITRATE 50 UG/ML
INJECTION, SOLUTION INTRAMUSCULAR; INTRAVENOUS
Status: DISCONTINUED | OUTPATIENT
Start: 2025-03-17 | End: 2025-03-17 | Stop reason: SDUPTHER

## 2025-03-17 RX ORDER — SODIUM CHLORIDE 0.9 % (FLUSH) 0.9 %
5-40 SYRINGE (ML) INJECTION PRN
Status: DISCONTINUED | OUTPATIENT
Start: 2025-03-17 | End: 2025-03-17

## 2025-03-17 RX ORDER — CARBOPROST TROMETHAMINE 250 UG/ML
250 INJECTION, SOLUTION INTRAMUSCULAR PRN
Status: DISCONTINUED | OUTPATIENT
Start: 2025-03-17 | End: 2025-03-17

## 2025-03-17 RX ORDER — PROMETHAZINE HYDROCHLORIDE 25 MG/1
12.5 TABLET ORAL EVERY 6 HOURS PRN
Status: DISPENSED | OUTPATIENT
Start: 2025-03-17 | End: 2025-03-18

## 2025-03-17 RX ORDER — DIPHENHYDRAMINE HYDROCHLORIDE 50 MG/ML
25 INJECTION, SOLUTION INTRAMUSCULAR; INTRAVENOUS EVERY 6 HOURS PRN
Status: DISCONTINUED | OUTPATIENT
Start: 2025-03-17 | End: 2025-03-20 | Stop reason: HOSPADM

## 2025-03-17 RX ORDER — FAMOTIDINE 20 MG/1
20 TABLET, FILM COATED ORAL 2 TIMES DAILY
Status: DISCONTINUED | OUTPATIENT
Start: 2025-03-17 | End: 2025-03-20 | Stop reason: HOSPADM

## 2025-03-17 RX ORDER — IBUPROFEN 800 MG/1
800 TABLET, FILM COATED ORAL EVERY 8 HOURS
Status: DISCONTINUED | OUTPATIENT
Start: 2025-03-18 | End: 2025-03-20 | Stop reason: HOSPADM

## 2025-03-17 RX ORDER — NALBUPHINE HYDROCHLORIDE 10 MG/ML
5 INJECTION INTRAMUSCULAR; INTRAVENOUS; SUBCUTANEOUS EVERY 4 HOURS PRN
Status: ACTIVE | OUTPATIENT
Start: 2025-03-17 | End: 2025-03-18

## 2025-03-17 RX ORDER — CITRIC ACID/SODIUM CITRATE 334-500MG
30 SOLUTION, ORAL ORAL ONCE
Status: COMPLETED | OUTPATIENT
Start: 2025-03-17 | End: 2025-03-17

## 2025-03-17 RX ORDER — KETOROLAC TROMETHAMINE 30 MG/ML
INJECTION, SOLUTION INTRAMUSCULAR; INTRAVENOUS
Status: DISCONTINUED | OUTPATIENT
Start: 2025-03-17 | End: 2025-03-17 | Stop reason: SDUPTHER

## 2025-03-17 RX ORDER — PROMETHAZINE HYDROCHLORIDE 25 MG/ML
12.5 INJECTION, SOLUTION INTRAMUSCULAR; INTRAVENOUS EVERY 6 HOURS PRN
Status: DISCONTINUED | OUTPATIENT
Start: 2025-03-17 | End: 2025-03-20 | Stop reason: HOSPADM

## 2025-03-17 RX ORDER — SODIUM CHLORIDE 0.9 % (FLUSH) 0.9 %
5-40 SYRINGE (ML) INJECTION EVERY 12 HOURS SCHEDULED
Status: DISCONTINUED | OUTPATIENT
Start: 2025-03-17 | End: 2025-03-20 | Stop reason: HOSPADM

## 2025-03-17 RX ORDER — ONDANSETRON 2 MG/ML
INJECTION INTRAMUSCULAR; INTRAVENOUS
Status: DISCONTINUED | OUTPATIENT
Start: 2025-03-17 | End: 2025-03-17 | Stop reason: SDUPTHER

## 2025-03-17 RX ORDER — PHENYLEPHRINE HCL IN 0.9% NACL 1 MG/10 ML
SYRINGE (ML) INTRAVENOUS
Status: DISCONTINUED | OUTPATIENT
Start: 2025-03-17 | End: 2025-03-17 | Stop reason: SDUPTHER

## 2025-03-17 RX ORDER — OXYCODONE HYDROCHLORIDE 5 MG/1
10 TABLET ORAL EVERY 4 HOURS PRN
Status: DISCONTINUED | OUTPATIENT
Start: 2025-03-17 | End: 2025-03-20 | Stop reason: HOSPADM

## 2025-03-17 RX ORDER — SODIUM CHLORIDE, SODIUM LACTATE, POTASSIUM CHLORIDE, AND CALCIUM CHLORIDE .6; .31; .03; .02 G/100ML; G/100ML; G/100ML; G/100ML
1000 INJECTION, SOLUTION INTRAVENOUS PRN
Status: DISCONTINUED | OUTPATIENT
Start: 2025-03-17 | End: 2025-03-17

## 2025-03-17 RX ORDER — SODIUM CHLORIDE 9 MG/ML
INJECTION, SOLUTION INTRAVENOUS PRN
Status: DISCONTINUED | OUTPATIENT
Start: 2025-03-17 | End: 2025-03-20 | Stop reason: HOSPADM

## 2025-03-17 RX ORDER — TRANEXAMIC ACID 10 MG/ML
1000 INJECTION, SOLUTION INTRAVENOUS
Status: DISCONTINUED | OUTPATIENT
Start: 2025-03-17 | End: 2025-03-17

## 2025-03-17 RX ORDER — ENOXAPARIN SODIUM 100 MG/ML
40 INJECTION SUBCUTANEOUS DAILY
Status: DISCONTINUED | OUTPATIENT
Start: 2025-03-18 | End: 2025-03-20 | Stop reason: HOSPADM

## 2025-03-17 RX ORDER — METHYLERGONOVINE MALEATE 0.2 MG/ML
200 INJECTION INTRAVENOUS PRN
Status: DISCONTINUED | OUTPATIENT
Start: 2025-03-17 | End: 2025-03-17

## 2025-03-17 RX ORDER — SODIUM CHLORIDE 0.9 % (FLUSH) 0.9 %
5-40 SYRINGE (ML) INJECTION EVERY 12 HOURS SCHEDULED
Status: DISCONTINUED | OUTPATIENT
Start: 2025-03-17 | End: 2025-03-17

## 2025-03-17 RX ORDER — ACETAMINOPHEN 325 MG/1
975 TABLET ORAL ONCE
Status: COMPLETED | OUTPATIENT
Start: 2025-03-17 | End: 2025-03-17

## 2025-03-17 RX ORDER — SODIUM CHLORIDE, SODIUM LACTATE, POTASSIUM CHLORIDE, AND CALCIUM CHLORIDE .6; .31; .03; .02 G/100ML; G/100ML; G/100ML; G/100ML
500 INJECTION, SOLUTION INTRAVENOUS PRN
Status: DISCONTINUED | OUTPATIENT
Start: 2025-03-17 | End: 2025-03-17

## 2025-03-17 RX ORDER — LIDOCAINE HYDROCHLORIDE 10 MG/ML
30 INJECTION, SOLUTION EPIDURAL; INFILTRATION; INTRACAUDAL; PERINEURAL PRN
Status: DISCONTINUED | OUTPATIENT
Start: 2025-03-17 | End: 2025-03-17

## 2025-03-17 RX ORDER — ONDANSETRON 4 MG/1
4 TABLET, ORALLY DISINTEGRATING ORAL EVERY 8 HOURS PRN
Status: DISCONTINUED | OUTPATIENT
Start: 2025-03-17 | End: 2025-03-20 | Stop reason: HOSPADM

## 2025-03-17 RX ORDER — ACETAMINOPHEN 500 MG
1000 TABLET ORAL EVERY 8 HOURS SCHEDULED
Status: DISCONTINUED | OUTPATIENT
Start: 2025-03-17 | End: 2025-03-20 | Stop reason: HOSPADM

## 2025-03-17 RX ORDER — FAMOTIDINE 10 MG/ML
20 INJECTION, SOLUTION INTRAVENOUS ONCE
Status: COMPLETED | OUTPATIENT
Start: 2025-03-17 | End: 2025-03-17

## 2025-03-17 RX ORDER — MORPHINE SULFATE 0.5 MG/ML
INJECTION, SOLUTION EPIDURAL; INTRATHECAL; INTRAVENOUS
Status: DISCONTINUED | OUTPATIENT
Start: 2025-03-17 | End: 2025-03-17 | Stop reason: SDUPTHER

## 2025-03-17 RX ORDER — MISOPROSTOL 200 UG/1
400 TABLET ORAL PRN
Status: DISCONTINUED | OUTPATIENT
Start: 2025-03-17 | End: 2025-03-17

## 2025-03-17 RX ORDER — DIPHENHYDRAMINE HCL 25 MG
25 TABLET ORAL EVERY 6 HOURS PRN
Status: DISCONTINUED | OUTPATIENT
Start: 2025-03-17 | End: 2025-03-20 | Stop reason: HOSPADM

## 2025-03-17 RX ORDER — SODIUM CHLORIDE 0.9 % (FLUSH) 0.9 %
10 SYRINGE (ML) INJECTION PRN
Status: DISCONTINUED | OUTPATIENT
Start: 2025-03-17 | End: 2025-03-17

## 2025-03-17 RX ORDER — KETOROLAC TROMETHAMINE 30 MG/ML
30 INJECTION, SOLUTION INTRAMUSCULAR; INTRAVENOUS EVERY 6 HOURS
Status: DISPENSED | OUTPATIENT
Start: 2025-03-17 | End: 2025-03-18

## 2025-03-17 RX ORDER — PROCHLORPERAZINE EDISYLATE 5 MG/ML
10 INJECTION INTRAMUSCULAR; INTRAVENOUS EVERY 6 HOURS PRN
Status: ACTIVE | OUTPATIENT
Start: 2025-03-17 | End: 2025-03-18

## 2025-03-17 RX ORDER — SODIUM CHLORIDE, SODIUM LACTATE, POTASSIUM CHLORIDE, AND CALCIUM CHLORIDE .6; .31; .03; .02 G/100ML; G/100ML; G/100ML; G/100ML
1000 INJECTION, SOLUTION INTRAVENOUS ONCE
Status: COMPLETED | OUTPATIENT
Start: 2025-03-17 | End: 2025-03-17

## 2025-03-17 RX ORDER — TERBUTALINE SULFATE 1 MG/ML
0.25 INJECTION SUBCUTANEOUS
Status: DISCONTINUED | OUTPATIENT
Start: 2025-03-17 | End: 2025-03-17

## 2025-03-17 RX ORDER — DEXAMETHASONE SODIUM PHOSPHATE 4 MG/ML
INJECTION, SOLUTION INTRA-ARTICULAR; INTRALESIONAL; INTRAMUSCULAR; INTRAVENOUS; SOFT TISSUE
Status: DISCONTINUED | OUTPATIENT
Start: 2025-03-17 | End: 2025-03-17 | Stop reason: SDUPTHER

## 2025-03-17 RX ORDER — OXYCODONE HYDROCHLORIDE 5 MG/1
5 TABLET ORAL EVERY 4 HOURS PRN
Status: DISCONTINUED | OUTPATIENT
Start: 2025-03-17 | End: 2025-03-20 | Stop reason: HOSPADM

## 2025-03-17 RX ORDER — ONDANSETRON 4 MG/1
4 TABLET, ORALLY DISINTEGRATING ORAL EVERY 6 HOURS PRN
Status: DISCONTINUED | OUTPATIENT
Start: 2025-03-17 | End: 2025-03-17

## 2025-03-17 RX ADMIN — FAMOTIDINE 20 MG: 10 INJECTION, SOLUTION INTRAVENOUS at 15:46

## 2025-03-17 RX ADMIN — AZITHROMYCIN 500 MG: 500 INJECTION, POWDER, LYOPHILIZED, FOR SOLUTION INTRAVENOUS at 16:35

## 2025-03-17 RX ADMIN — ONDANSETRON 4 MG: 2 INJECTION INTRAMUSCULAR; INTRAVENOUS at 15:42

## 2025-03-17 RX ADMIN — SODIUM CHLORIDE, SODIUM LACTATE, POTASSIUM CHLORIDE, AND CALCIUM CHLORIDE: .6; .31; .03; .02 INJECTION, SOLUTION INTRAVENOUS at 15:17

## 2025-03-17 RX ADMIN — PROMETHAZINE HYDROCHLORIDE 12.5 MG: 25 INJECTION INTRAMUSCULAR; INTRAVENOUS at 19:20

## 2025-03-17 RX ADMIN — ONDANSETRON 4 MG: 2 INJECTION INTRAMUSCULAR; INTRAVENOUS at 21:35

## 2025-03-17 RX ADMIN — KETOROLAC TROMETHAMINE 30 MG: 30 INJECTION, SOLUTION INTRAMUSCULAR; INTRAVENOUS at 17:14

## 2025-03-17 RX ADMIN — Medication 100 MCG: at 16:48

## 2025-03-17 RX ADMIN — SODIUM CHLORIDE, SODIUM LACTATE, POTASSIUM CHLORIDE, AND CALCIUM CHLORIDE 1000 ML: .6; .31; .03; .02 INJECTION, SOLUTION INTRAVENOUS at 15:49

## 2025-03-17 RX ADMIN — WATER 2000 MG: 1 INJECTION INTRAMUSCULAR; INTRAVENOUS; SUBCUTANEOUS at 16:00

## 2025-03-17 RX ADMIN — ONDANSETRON 4 MG: 2 INJECTION INTRAMUSCULAR; INTRAVENOUS at 16:14

## 2025-03-17 RX ADMIN — DEXAMETHASONE SODIUM PHOSPHATE 8 MG: 4 INJECTION, SOLUTION INTRAMUSCULAR; INTRAVENOUS at 16:53

## 2025-03-17 RX ADMIN — Medication 909 ML/HR: at 16:53

## 2025-03-17 RX ADMIN — Medication 100 MCG: at 16:35

## 2025-03-17 RX ADMIN — SODIUM CITRATE AND CITRIC ACID MONOHYDRATE 30 ML: 500; 334 SOLUTION ORAL at 15:44

## 2025-03-17 RX ADMIN — Medication 100 MCG: at 17:03

## 2025-03-17 RX ADMIN — Medication 100 MCG: at 17:07

## 2025-03-17 RX ADMIN — MORPHINE SULFATE 0.15 MG: 0.5 INJECTION, SOLUTION EPIDURAL; INTRATHECAL; INTRAVENOUS at 16:30

## 2025-03-17 RX ADMIN — ACETAMINOPHEN 975 MG: 325 TABLET ORAL at 15:47

## 2025-03-17 RX ADMIN — FENTANYL CITRATE 15 MCG: 50 INJECTION, SOLUTION INTRAMUSCULAR; INTRAVENOUS at 16:30

## 2025-03-17 RX ADMIN — Medication 100 MCG: at 16:44

## 2025-03-17 ASSESSMENT — PAIN DESCRIPTION - RADICULAR PAIN: RADICULAR_PAIN: ABSENT

## 2025-03-17 NOTE — H&P
gravid  Fetal heart rate:           Baseline:  140        Accelerations:  present       Long Term Variability:  moderate       Decelerations:  absent       Pelvis:  Adequate pelvis  Cervix: 1 cm/Long/medium consistency/-3/midposition; Monteiro Score: 3    Contraction frequency:  2-5 min    Membranes:  Ruptured clear fluid    ASSESSMENT:    19 y.o. female at 37w3d, Estimated Date of Delivery: 4/4/25  GBS:negative  FHR Cat: I  PROM for clear fluid at 1330  Anxiety/depression  Elevated hgbS; no hematology f/u but likely heterozygous    PLAN:  Admit, anticipate normal delivery, routine labor orders  Other: IV hydration and antiemetics, plan cytotec for cervical ripening and labor induction, epidural when pt desires.       I have collaborated and updated Dr. Arceo and the MD agrees with the current POC.      Time Spent: 15 min    EDER Monterroso CNM

## 2025-03-17 NOTE — OP NOTE
Sponge, lap and needle counts were correct times two. The cancino was draining clear urine at the end of the procedure.  The patient was taken to the recovery room in a stable condition.         POSTOPERATIVE COURSE  The patient will be followed for routine postoperative care and be discharged with prescriptions for pain medications and follow-up in the office in 2 weeks.      Electronically signed by: Meryl Arceo DO 3/17/2025

## 2025-03-17 NOTE — ANESTHESIA PROCEDURE NOTES
CSE Block    Patient location during procedure: OB  Start time: 3/17/2025 4:30 PM  Reason for block: primary anesthetic  Staffing  Performed: resident/CRNA   Anesthesiologist: Anibal Rubio MD  Resident/CRNA: Roxy Grant APRN - CRNA  Performed by: Roxy Grant APRN - CRNA  Authorized by: Anibal Rubio MD    CSE  Patient position: sitting  Prep: ChloraPrep  Patient monitoring: continuous pulse ox and frequent blood pressure checks  Approach: midline  Provider prep: mask  Spinal Needle  Needle type: pencil-tip   Needle gauge: 25 G  Needle length: 5 inch.  Epidural Needle  Injection technique: ZACK saline  Needle type: Tuohy   Needle gauge: 17 G  Needle length: 3.5 in  Needle insertion depth: 8 cm  Location: lumbar (1-5)  Catheter  Catheter type: side hole  Catheter size: 18 G  Catheter at skin depth: 15 cm  AssessmentT4  Hemodynamics: stable  Additional Notes  Attempted spinal with 24g 4 in spinal needle, needle hubbed, - CSF  Changed to 5 in 25g spinal needle, unable to puncture dura, changed to CSE, + ZACK with MARTA navarro  Preanesthetic Checklist  Completed: patient identified, IV checked, site marked, risks and benefits discussed, surgical/procedural consents, equipment checked, pre-op evaluation, timeout performed, anesthesia consent given, oxygen available, monitors applied/VS acknowledged, fire risk safety assessment completed and verbalized and blood product R/B/A discussed and consented

## 2025-03-17 NOTE — PROGRESS NOTES
Pt arrives to birthing center in wheelchair. Pt thinks her water broke. Pt taken to triage 3 and instructed to change into gown and obtain urine sample.

## 2025-03-17 NOTE — ANESTHESIA PRE PROCEDURE
Department of Anesthesiology  Preprocedure Note       Name:  Warren Briscoe   Age:  19 y.o.  :  2005                                          MRN:  [id number]         Date:  3/17/2025      Surgeon: Surgeon(s):  Meryl Arceo DO    Procedure: Procedure(s):   SECTION    Medications prior to admission:   Prior to Admission medications    Medication Sig Start Date End Date Taking? Authorizing Provider   Prenatal MV-Min-FA-Omega-3 (PRENATAL GUMMIES/DHA & FA) 0.4-32.5 MG CHEW Take one by mouth daily 24  Yes Jennifer De Guzman PA-C   cyclobenzaprine (FLEXERIL) 5 MG tablet Take 1 tablet by mouth 3 times daily as needed for Muscle spasms  Patient not taking: Reported on 3/17/2025    Provider, MD Dann       Current medications:    Current Facility-Administered Medications   Medication Dose Route Frequency Provider Last Rate Last Admin   • lactated ringers bolus 500 mL  500 mL IntraVENous PRN Tiffany Alvarenga APRN - CNM        Or   • lactated ringers bolus 1,000 mL  1,000 mL IntraVENous PRN Tiffany Alvarenga APRN - CNM       • sodium chloride flush 0.9 % injection 5-40 mL  5-40 mL IntraVENous 2 times per day Tiffany Alvarenga APRN - CNM       • sodium chloride flush 0.9 % injection 5-40 mL  5-40 mL IntraVENous PRN Tiffany Alvarenga APRN - CNM       • 0.9 % sodium chloride infusion   IntraVENous PRN Tiffany Alvarenga APRN - CNM       • methylergonovine (METHERGINE) injection 200 mcg  200 mcg IntraMUSCular PRN Tiffany Alvarenga APRN - CNM       • carboprost (HEMABATE) injection 250 mcg  250 mcg IntraMUSCular PRN Tiffany Alvarenga APRN - CNM       • miSOPROStol (CYTOTEC) tablet 400 mcg  400 mcg Buccal PRN Tiffany Alvarenga APRN - CNM       • tranexamic acid-NaCl IVPB premix 1,000 mg  1,000 mg IntraVENous Once PRN Tiffany Alvarenga APRN - CNM       • terbutaline (BRETHINE) injection 0.25 mg  0.25 mg SubCUTAneous Once PRN Tiffany Alvarenga APRN - CNM       • lidocaine PF 1 % injection 30 mL  30 mL Other PRN

## 2025-03-17 NOTE — PROGRESS NOTES
20 yo  37w3d presenting with SROM. Breech fetal presentation noted on BSUS. Discussed recommendation for PLTCS due to breech fetal position. Patient expressed understanding and desires to proceed.     Patient was counseled on the risks, benefits, alternatives and indications of  delivery. Risks include, but are not limited to infection, bleeding possibly requiring transfusion (with associated risks of HIV, hepatitis, and other blood-borne illnesses) or hysterectomy, injury to surrounding organs, vasculature, and nerves, injury to fetus, post-operative thromboembolic events (including DVT and PE), risk of anesthesia, and risk of death.  Risk for future pregnancy and delivery concerns including risk of trial of labor after  section and repeat  section reviewed.  Patient verbalized understanding of these risks. All questions were answered to her satisfaction. Pt desires to proceed with  delivery. 2g Ancef and 500 mg azithromycin for surgical prophylaxis.     Electronically signed by: Meryl Arceo DO 3/17/2025

## 2025-03-17 NOTE — PROGRESS NOTES
Breech presentation noted on bedside u/s. Pt notified of need for c/s. Pt agreeable to POC. Dr. Arceo aware.     Tiffany Alvarenga, EDER - FAMILIA

## 2025-03-18 LAB
ERYTHROCYTE [DISTWIDTH] IN BLOOD BY AUTOMATED COUNT: 14 % (ref 11.7–14.9)
HCT VFR BLD AUTO: 29.6 % (ref 37–47)
HGB BLD-MCNC: 9.9 G/DL (ref 12.5–16)
MCH RBC QN AUTO: 28.3 PG (ref 27–31)
MCHC RBC AUTO-ENTMCNC: 33.4 G/DL (ref 32–36)
MCV RBC AUTO: 84.6 FL (ref 78–100)
PLATELET # BLD AUTO: 213 K/UL (ref 140–440)
PMV BLD AUTO: 12.1 FL (ref 7.5–11.1)
RBC # BLD AUTO: 3.5 M/UL (ref 4.2–5.4)
WBC OTHER # BLD: 14.1 K/UL (ref 4–10.5)

## 2025-03-18 PROCEDURE — APPNB30 APP NON BILLABLE TIME 0-30 MINS: Performed by: NURSE PRACTITIONER

## 2025-03-18 PROCEDURE — 85027 COMPLETE CBC AUTOMATED: CPT

## 2025-03-18 PROCEDURE — 94761 N-INVAS EAR/PLS OXIMETRY MLT: CPT

## 2025-03-18 PROCEDURE — 1220000000 HC SEMI PRIVATE OB R&B

## 2025-03-18 PROCEDURE — 6370000000 HC RX 637 (ALT 250 FOR IP): Performed by: STUDENT IN AN ORGANIZED HEALTH CARE EDUCATION/TRAINING PROGRAM

## 2025-03-18 PROCEDURE — 6360000002 HC RX W HCPCS: Performed by: STUDENT IN AN ORGANIZED HEALTH CARE EDUCATION/TRAINING PROGRAM

## 2025-03-18 PROCEDURE — 2580000003 HC RX 258: Performed by: NURSE PRACTITIONER

## 2025-03-18 RX ORDER — FERROUS SULFATE 325(65) MG
325 TABLET ORAL
Status: DISCONTINUED | OUTPATIENT
Start: 2025-03-19 | End: 2025-03-20 | Stop reason: HOSPADM

## 2025-03-18 RX ORDER — SODIUM CHLORIDE, SODIUM LACTATE, POTASSIUM CHLORIDE, AND CALCIUM CHLORIDE .6; .31; .03; .02 G/100ML; G/100ML; G/100ML; G/100ML
500 INJECTION, SOLUTION INTRAVENOUS ONCE
Status: COMPLETED | OUTPATIENT
Start: 2025-03-18 | End: 2025-03-18

## 2025-03-18 RX ADMIN — DOCUSATE SODIUM 100 MG: 100 CAPSULE, LIQUID FILLED ORAL at 13:05

## 2025-03-18 RX ADMIN — ACETAMINOPHEN 1000 MG: 500 TABLET ORAL at 22:06

## 2025-03-18 RX ADMIN — SODIUM CHLORIDE, SODIUM LACTATE, POTASSIUM CHLORIDE, AND CALCIUM CHLORIDE 500 ML: .6; .31; .03; .02 INJECTION, SOLUTION INTRAVENOUS at 09:29

## 2025-03-18 RX ADMIN — DOCUSATE SODIUM 100 MG: 100 CAPSULE, LIQUID FILLED ORAL at 22:06

## 2025-03-18 RX ADMIN — KETOROLAC TROMETHAMINE 30 MG: 30 INJECTION, SOLUTION INTRAMUSCULAR; INTRAVENOUS at 00:54

## 2025-03-18 RX ADMIN — KETOROLAC TROMETHAMINE 30 MG: 30 INJECTION, SOLUTION INTRAMUSCULAR; INTRAVENOUS at 06:43

## 2025-03-18 RX ADMIN — ENOXAPARIN SODIUM 40 MG: 100 INJECTION SUBCUTANEOUS at 12:54

## 2025-03-18 RX ADMIN — FAMOTIDINE 20 MG: 20 TABLET, FILM COATED ORAL at 22:06

## 2025-03-18 RX ADMIN — ACETAMINOPHEN 1000 MG: 500 TABLET ORAL at 12:53

## 2025-03-18 ASSESSMENT — PAIN SCALES - GENERAL
PAINLEVEL_OUTOF10: 3
PAINLEVEL_OUTOF10: 5

## 2025-03-18 ASSESSMENT — PAIN DESCRIPTION - LOCATION
LOCATION: INCISION
LOCATION: ABDOMEN

## 2025-03-18 ASSESSMENT — PAIN DESCRIPTION - ORIENTATION
ORIENTATION: LOWER
ORIENTATION: LOWER

## 2025-03-18 ASSESSMENT — PAIN DESCRIPTION - DESCRIPTORS
DESCRIPTORS: CRAMPING
DESCRIPTORS: CRAMPING

## 2025-03-18 ASSESSMENT — PAIN - FUNCTIONAL ASSESSMENT
PAIN_FUNCTIONAL_ASSESSMENT: ACTIVITIES ARE NOT PREVENTED
PAIN_FUNCTIONAL_ASSESSMENT: ACTIVITIES ARE NOT PREVENTED

## 2025-03-18 NOTE — PROGRESS NOTES
Department of Obstetrics and Gynecology  Labor and Delivery   Post Partum Progress Note      SUBJECTIVE:  Doing well with no complaints. Ambulating throughout room without dizziness or other s/s of anemia. Reports bleeding is decreasing and pain is well controlled with medication. Denies pain or discharge at incisional site. Has voided without difficulty. Has not had BM, + flatus. Eating and drinking well. Denies HA/visual changes/epigastric pain. Breastfeeding is going well. Reports good social support. Denies emotional concerns at this time.     OBJECTIVE:      Vitals:  /72   Pulse 66   Temp 98.2 °F (36.8 °C) (Oral)   Resp 16   LMP 06/28/2024   SpO2 99%   Breastfeeding Unknown   Lab Results   Component Value Date    WBC 14.1 (H) 03/18/2025    HGB 9.9 (L) 03/18/2025    HCT 29.6 (L) 03/18/2025    MCV 84.6 03/18/2025     03/18/2025       CONSTITUTIONAL: generally well-appearing.   ABDOMEN:  Soft, well contracted uterus. Fundus firm at u-1. C/s incision c/d/i. No erythema or discharge noted. BS present x 4 quadrants.   LOCHIA: Normal per pt  LUNGS: CTAB  HEART: RRR,   EXTREMITIES: No calf tenderness, erythema or swelling bilaterally       ASSESSMENT:      PPD # 1  S/p C/s for breech   Breastfeeding well  ABLA start ferrous sulfate     PLAN:   Ferrous sulfate today   Routine PP orders  Will plan for discharge on PPD3.    EDER Moreno CNM

## 2025-03-18 NOTE — CARE COORDINATION
LSW in to see mother of baby due to history of anxiety and depression. LSW met with mother of baby, maternal great-grandparents, and infant at bedside, introduced self and role. Infant girl: Janelle Loaiza is mother of baby's 1st child, lives with maternal grandfather at home. Mother of baby has all necessary supplies for infant. Mother of baby is planning on breast feeding, is connected with WIC. Mother of baby has regular transportation. Mother of baby is unsure of who she will use for pediatric care, list provided. Mother of baby confirmed history of anxiety and depression. Mother of baby stated that she felt \"fine\" throughout her pregnancy. Mother of baby is not on any medication or seeing anyone for anxiety or depression. Mother of baby denied wanting any resources. LSW discussed baby blues versus postpartum depression with mother of baby, discussed warning signs and symptoms of postpartum depression. Mother of baby verbalized understanding of above. No other questions or needs stated at this time. LSW to remain available.

## 2025-03-18 NOTE — LACTATION NOTE
Mother calls out and states she did breast feed infant and that infant breast fed on both sides but was sleepy. Encouraged mother to call at time for next feeding to assist her with keeping infant awake for feedings. Informed her that it is common for infants to be sleepy the first day.    Discussed with mother about breast feeding and that some infants will have what is termed \"second night syndrome\" where the infant will want to eat frequently, including even every hour. Informed mother that wanting to cluster feed, where infant does eat every hour, does not mean that infant is not getting enough milk. Encouraged mother to hold infant skin to skin as much as possible. Encouraged mother to drink plenty of fluid and to rest between feedings.    Reminded mother to hold infant skin to skin as much as possible between feeding times. Also reminded mother about safe sleep and if she is tired, to put infant in the crib. Mother verbalizes understanding. Reminded mother to breast feed at least every 2-3 hours and to offer both breast with each feeding. Informed mother that the infant should do at least 10 minutes (or longer) at each breast and to not limit the time infant is at the breast. Reminded mother to watch for feeding cues such as sucking on fists and rooting to start feedings. Informed mother that if it has been 3 hours and infant has not awaken, she may need to wake infant by unwrapping infant, gently massaging baby, burping infant prior to latch on and/or change diaper. Reminded mother to make sure infant has a deep latch, not just the nipple and that she should feel a tug with feeding but that it should not be painful.     Reminded mother that infants lose weight prior to discharge and that we do not want them to lose more than 10%. Infant should then be back to birth weight by the time he is 10-14 days old. Infants then usually gain from 0.5 to 1 ounce per day the first month.

## 2025-03-18 NOTE — LACTATION NOTE
This is the first baby for mother. Mother delivered by  section because of breach presentation. Mother was 37+3 weeks.    Mother did NOT take a breast feeding class.    Mother states her plan is to exclusively breast feed. Infant has received formula for at least one feeding.     Nipples protrude and are intact. Able to easily express breast milk.     Infant birth weight is 7 pounds 14.7 ounces.     Mother states infant breast fed well with first feeding and then has been sleepy. Mother has used hospital electric breast pump.     Initiated breast feeding and breast feeding teaching. Mother states she would like help with breast feeding and gives permission for breast to be touched by IBCLC to assist with latch on and positioning of infant. Discuss with mother different position changes: side lying, cradle hold, and football hold. Discuss with mother that breast feeding babies should breast feed every 2-3 hours for 10 to 15 minutes on each side. Also discuss with mother to listen and watch for infant feeding cues and that the baby may want to breast feed more frequently. Discuss with mother that she has colostrum for the first few days until her milk comes in and that this is enough for the baby the first few days. Explained to mother that the stomach of the baby is small so it fills up quickly and then empties quickly and that is why the infant needs to breast feed frequently. Discuss with mother that she needs to hold the infant head securely during feedings and to hold her breast with her hand to help guide the breast in infant mouth, and that the infant needs to have a deep latch on, more than just the nipple. Explained to mother that this helps stimulate the milk ducts which are farther back on the breast to produce and release milk and also helps to decrease soreness. Explained to mother that if the baby latches on to just the nipple it will increase soreness for her. Discuss with mother that when the

## 2025-03-19 PROCEDURE — 6370000000 HC RX 637 (ALT 250 FOR IP): Performed by: STUDENT IN AN ORGANIZED HEALTH CARE EDUCATION/TRAINING PROGRAM

## 2025-03-19 PROCEDURE — 6360000002 HC RX W HCPCS: Performed by: STUDENT IN AN ORGANIZED HEALTH CARE EDUCATION/TRAINING PROGRAM

## 2025-03-19 PROCEDURE — 94761 N-INVAS EAR/PLS OXIMETRY MLT: CPT

## 2025-03-19 PROCEDURE — APPNB30 APP NON BILLABLE TIME 0-30 MINS

## 2025-03-19 PROCEDURE — 1220000000 HC SEMI PRIVATE OB R&B

## 2025-03-19 RX ADMIN — DOCUSATE SODIUM 100 MG: 100 CAPSULE, LIQUID FILLED ORAL at 21:37

## 2025-03-19 RX ADMIN — ENOXAPARIN SODIUM 40 MG: 100 INJECTION SUBCUTANEOUS at 08:31

## 2025-03-19 RX ADMIN — ACETAMINOPHEN 1000 MG: 500 TABLET ORAL at 21:37

## 2025-03-19 RX ADMIN — ACETAMINOPHEN 1000 MG: 500 TABLET ORAL at 06:19

## 2025-03-19 RX ADMIN — FAMOTIDINE 20 MG: 20 TABLET, FILM COATED ORAL at 07:25

## 2025-03-19 RX ADMIN — OXYCODONE HYDROCHLORIDE 5 MG: 5 TABLET ORAL at 14:08

## 2025-03-19 RX ADMIN — IBUPROFEN 800 MG: 800 TABLET, FILM COATED ORAL at 02:10

## 2025-03-19 RX ADMIN — FAMOTIDINE 20 MG: 20 TABLET, FILM COATED ORAL at 21:37

## 2025-03-19 RX ADMIN — IBUPROFEN 800 MG: 800 TABLET, FILM COATED ORAL at 14:07

## 2025-03-19 RX ADMIN — OXYCODONE HYDROCHLORIDE 5 MG: 5 TABLET ORAL at 07:24

## 2025-03-19 ASSESSMENT — PAIN SCALES - GENERAL
PAINLEVEL_OUTOF10: 6

## 2025-03-19 ASSESSMENT — PAIN DESCRIPTION - DESCRIPTORS
DESCRIPTORS: CRAMPING
DESCRIPTORS: CRAMPING

## 2025-03-19 ASSESSMENT — PAIN DESCRIPTION - ORIENTATION
ORIENTATION: LOWER
ORIENTATION: LOWER

## 2025-03-19 ASSESSMENT — PAIN DESCRIPTION - LOCATION
LOCATION: ABDOMEN
LOCATION: ABDOMEN

## 2025-03-19 NOTE — LACTATION NOTE
Infant has had some low blood glucose levels. Mother states she has been breast feeding on one side only and then giving formula. Encouraged mother to breastfeed on both breasts with each feeding, for 10 minutes or longer and to breast feed at least every 3 hours.     Mother states she has not used the hospital electric breast pump during the night as was encouraged to use hospital electric breast pump after each feeding to keep breasts stimulated for milk production.    Mother is holding infant affectionately. Encouraged mother to call for any breast feeding assistance as needed.

## 2025-03-19 NOTE — FLOWSHEET NOTE
Abdominal binder requested and given. Patient states \"I will put it on later\". Patient denies needs. Call light within reach.    No Vaccines Administered.

## 2025-03-19 NOTE — ANESTHESIA POSTPROCEDURE EVALUATION
Department of Anesthesiology  Postprocedure Note    Patient: Warren Briscoe  MRN: [id number]  YOB: 2005  Date of evaluation: 3/19/2025    Procedure Summary       Date: 25 Room / Location: [facility] [address] [place] [address]    Anesthesia Start: 1614 Anesthesia Stop: 1728    Procedure:  SECTION (Abdomen) Diagnosis:       Pregnancy related condition in third trimester      (Breech)    Surgeons: Meryl Arceo DO Responsible Provider: Anibal Rubio MD    Anesthesia Type: Spinal ASA Status: 2 - Emergent            Anesthesia Type: Spinal    Marly Phase I: Marly Score: 10    Marly Phase II:      Anesthesia Post Evaluation    Patient location during evaluation: floor  Patient participation: complete - patient participated  Level of consciousness: awake  Pain score: 1  Airway patency: patent  Nausea & Vomiting: no nausea and no vomiting  Cardiovascular status: blood pressure returned to baseline  Respiratory status: acceptable  Hydration status: euvolemic  Comments: Epidural catheter removed and charted per nursing staff. Site remains clean, dry and free from signs of infection.   Multimodal analgesia pain management approach  Pain management: adequate    No notable events documented.

## 2025-03-19 NOTE — PROGRESS NOTES
Department of Obstetrics and Gynecology  Labor and Delivery   Post Partum Progress Note      SUBJECTIVE:  Doing well with no complaints. Ambulating throughout room without dizziness or other s/s of anemia. Reports bleeding is decreasing and pain is well controlled with medication. Denies pain or discharge at incisional site. Has voided without difficulty. Has not had BM, + flatus. Eating and drinking well. Denies HA/visual changes/epigastric pain. Breastfeeding is going well. Reports good social support. Denies emotional concerns at this time.     OBJECTIVE:      Vitals:  /86   Pulse 63   Temp 98.4 °F (36.9 °C) (Oral)   Resp 20   LMP 06/28/2024   SpO2 98%   Breastfeeding Unknown   Lab Results   Component Value Date    WBC 14.1 (H) 03/18/2025    HGB 9.9 (L) 03/18/2025    HCT 29.6 (L) 03/18/2025    MCV 84.6 03/18/2025     03/18/2025       CONSTITUTIONAL: generally well-appearing.   ABDOMEN:  Soft, well contracted uterus. Fundus firm at u-1. C/s incision c/d/i. No erythema or discharge noted. BS present x 4 quadrants.   LOCHIA: Normal per pt  LUNGS: CTAB  HEART: RRR,   EXTREMITIES: No calf tenderness, erythema or swelling bilaterally       ASSESSMENT:      PPD # 2  S/p C/s  Breastfeeding well  GBS Negative  RH O+  Anemia- on iron    PLAN:     Will continue with PP POC   Encourage PO hydration and early ambulation   Up to shower with instruction on incision care   Will plan for discharge on PPD3.      Time Spent 15      EDER [facility] - FAMILIA

## 2025-03-19 NOTE — LACTATION NOTE
Mother states she has formula fed for the last few feedings. Mother was given a hospital electric breast pump to use during her stay and has not used yet. Reminded mother to directly breast feed for feedings to keep breasts stimulated and to help with infant to breast feed, but if she does not breast feed then she should pump for 15 minutes to keep breasts stimulated. Mother verbalizes understanding.

## 2025-03-20 VITALS
TEMPERATURE: 98.3 F | HEART RATE: 64 BPM | RESPIRATION RATE: 18 BRPM | SYSTOLIC BLOOD PRESSURE: 124 MMHG | OXYGEN SATURATION: 99 % | DIASTOLIC BLOOD PRESSURE: 76 MMHG

## 2025-03-20 PROCEDURE — 6360000002 HC RX W HCPCS: Performed by: STUDENT IN AN ORGANIZED HEALTH CARE EDUCATION/TRAINING PROGRAM

## 2025-03-20 PROCEDURE — 6370000000 HC RX 637 (ALT 250 FOR IP): Performed by: STUDENT IN AN ORGANIZED HEALTH CARE EDUCATION/TRAINING PROGRAM

## 2025-03-20 PROCEDURE — 6370000000 HC RX 637 (ALT 250 FOR IP): Performed by: NURSE PRACTITIONER

## 2025-03-20 RX ORDER — PSEUDOEPHEDRINE HCL 30 MG
100 TABLET ORAL 2 TIMES DAILY
Qty: 60 CAPSULE | Refills: 0 | Status: SHIPPED | OUTPATIENT
Start: 2025-03-20

## 2025-03-20 RX ORDER — FERROUS SULFATE 325(65) MG
325 TABLET ORAL
Qty: 30 TABLET | Refills: 3 | Status: SHIPPED | OUTPATIENT
Start: 2025-03-21

## 2025-03-20 RX ORDER — OXYCODONE HYDROCHLORIDE 5 MG/1
5 TABLET ORAL EVERY 6 HOURS PRN
Qty: 20 TABLET | Refills: 0 | Status: SHIPPED | OUTPATIENT
Start: 2025-03-20 | End: 2025-03-27

## 2025-03-20 RX ORDER — IBUPROFEN 800 MG/1
800 TABLET, FILM COATED ORAL EVERY 8 HOURS
Qty: 120 TABLET | Refills: 3 | Status: SHIPPED | OUTPATIENT
Start: 2025-03-20

## 2025-03-20 RX ADMIN — FERROUS SULFATE TAB 325 MG (65 MG ELEMENTAL FE) 325 MG: 325 (65 FE) TAB at 08:58

## 2025-03-20 RX ADMIN — DOCUSATE SODIUM 100 MG: 100 CAPSULE, LIQUID FILLED ORAL at 08:58

## 2025-03-20 RX ADMIN — ENOXAPARIN SODIUM 40 MG: 100 INJECTION SUBCUTANEOUS at 08:58

## 2025-03-20 RX ADMIN — IBUPROFEN 800 MG: 800 TABLET, FILM COATED ORAL at 08:58

## 2025-03-20 RX ADMIN — OXYCODONE HYDROCHLORIDE 10 MG: 5 TABLET ORAL at 01:16

## 2025-03-20 RX ADMIN — FAMOTIDINE 20 MG: 20 TABLET, FILM COATED ORAL at 08:58

## 2025-03-20 ASSESSMENT — PAIN DESCRIPTION - RADICULAR PAIN: RADICULAR_PAIN: ABSENT

## 2025-03-20 NOTE — DISCHARGE INSTRUCTIONS
Discharge Instructions    Thank you for letting us care for you and your family.The following are  discharge instructions for yourself and your baby. If you have any questions once you have arrived home please feel free to contact the birthing center at [phone].            MOM INSTRUCTIONS    DIET    Eat a well balanced diet focusing on foods high in fiber and protein.  Drink plenty of fluids (  8 to 10 glasses a day) especially water.  To avoid constipation you may take a mild stool softener as recommended by your doctor or midwife.    ACTIVITY    Gradually increase your activity. Resume your exercise regimen only after advised by you doctor or midwife.  Avoid lifting anything heavier than your baby for 6 weeks or until otherwise advised by your doctor or midwife.  Avoid driving for 2 weeks or if taking narcotics.  Climb stairs one at a time. Use caution when carrying your baby up and down the stairs.  NO SEXUAL ACTIVITY and nothing in your vagina( tampons or douching) for 4 to 6 weeks or until advised by your doctor or midwife.  Be prepared to discuss family planning at your follow-up OB visit.   You may feel tired or have a lack of energy. Nap when the baby naps to catch up on your sleep.You may continue to take prenatal vitamin to replenish nutrients post delivery as directed by your doctor or midwife.      EMOTIONS    You may feel sad, teary, overwhelmed and sanchez. Contact your OB provider if symptoms worsen or last more than 2 weeks. Contact your OB provider if you have thoughts of harming yourself or your baby.  If your baby will not stop crying and you are feeling overwhelmed, place your baby in a crib on their back and contact another adult for help. NEVER SHAKE A BABY.                   BLEEDING    Your vaginal bleeding will decrease in amount over the next 2 to 6 weeks.  You will notice that as your activity increases your flow may increase. This is your body's way of telling you to take it easy and rest

## 2025-03-20 NOTE — LACTATION NOTE
Entered mother room and mother is holding infant affectionately and securely. Infant had been in the nursery and been formula fed. Discussed with mother about breast feeding. Reminded her to keep breasts stimulated for milk production and that expressing breast milk via directly breast feeding and/or pumping to empty breasts will have her body make more milk, but not emptying breasts stops the body from making more milk. Mother verbalizes understanding. Mother has her electric breast pump prescription and reminded her to  her pump once discharged. Mother verbalizes understanding. Encouraged mother to call for any breast feeding assistance while here.

## 2025-03-20 NOTE — DISCHARGE SUMMARY
Obstetrical Discharge Form    Gestational Age:  37w3d    Antepartum complications:   Primigravida  Obesity (35),   Anxiety/depression; self managed  Panorama low risk, Horizon +sickle cell, FOB neg  Abnormal hemoglobin detected on A1c, referred to hematology      Date of Delivery:   3/17/2025      Type of Delivery:    for breech    Delivered By:    Dr Arceo             Baby:       Information for the patient's :  Serafin Briscoe [[id number]]      Anesthesia:    Spinal    Intrapartum complications: None    Feeding method:   breast    Postpartum complications: anemia    Discharge Date:       Condition of discharge:  good    Plan:   Follow up    in 1 week(s)

## 2025-03-20 NOTE — FLOWSHEET NOTE
ID bands checked. Infant's ID band removed and stapled to footprint sheet, the mother verified as correct, signed and witnessed by nurse. Hugs tag removed. Mother of baby signed Safe Baby Crib Form verifying that she does have a safe crib for baby at home. Discharge instructions given and reviewed. Patient voiced understanding. Rx's reviewed and Electronically sent to [facility]. Written and verbal education provided about opiate side effects and possibility of addiction. Patient voiced understanding. Patient is ambulating well at discharge with pain #0. Pt's  is driving patient and baby home. Mother verbalizes understanding to follow-up with Pediatric Provider in 1-3 days, and OB Provider  in 1 week as instructed. Baby pink, harnessed into carseat at discharge by parents. Parents and baby escorted to hospital exit by nurse.

## 2025-03-20 NOTE — PLAN OF CARE
Problem: ABCDS Injury Assessment  Goal: Absence of physical injury  Outcome: Adequate for Discharge     Problem: Vaginal Birth or  Section  Goal: Fetal and maternal status remain reassuring during the birth process  Description:  Birth OB-Pregnancy care plan goal which identifies if the fetal and maternal status remain reassuring during the birth process  Outcome: Adequate for Discharge     Problem: Postpartum  Goal: Experiences normal postpartum course  Description:  Postpartum OB-Pregnancy care plan goal which identifies if the mother is experiencing a normal postpartum course  Outcome: Adequate for Discharge  Goal: Appropriate maternal -  bonding  Description:  Postpartum OB-Pregnancy care plan goal which identifies if the mother and  are bonding appropriately  Outcome: Adequate for Discharge  Goal: Establishment of infant feeding pattern  Description:  Postpartum OB-Pregnancy care plan goal which identifies if the mother is establishing a feeding pattern with their   Outcome: Adequate for Discharge  Goal: Incisions, wounds, or drain sites healing without S/S of infection  Outcome: Adequate for Discharge     Problem: Pain  Goal: Verbalizes/displays adequate comfort level or baseline comfort level  Outcome: Adequate for Discharge     Problem: Infection - Adult  Goal: Absence of infection at discharge  Outcome: Adequate for Discharge  Goal: Absence of infection during hospitalization  Outcome: Adequate for Discharge  Goal: Absence of fever/infection during anticipated neutropenic period  Outcome: Adequate for Discharge     Problem: Safety - Adult  Goal: Free from fall injury  Outcome: Adequate for Discharge     Problem: Discharge Planning  Goal: Discharge to home or other facility with appropriate resources  Outcome: Adequate for Discharge     Problem: Chronic Conditions and Co-morbidities  Goal: Patient's chronic conditions and co-morbidity symptoms are monitored and maintained or 
  Problem: ABCDS Injury Assessment  Goal: Absence of physical injury  Outcome: Progressing     Problem: Vaginal Birth or  Section  Goal: Fetal and maternal status remain reassuring during the birth process  Description:  Birth OB-Pregnancy care plan goal which identifies if the fetal and maternal status remain reassuring during the birth process  Outcome: Progressing     Problem: Postpartum  Goal: Experiences normal postpartum course  Description:  Postpartum OB-Pregnancy care plan goal which identifies if the mother is experiencing a normal postpartum course  Outcome: Progressing  Goal: Appropriate maternal -  bonding  Description:  Postpartum OB-Pregnancy care plan goal which identifies if the mother and  are bonding appropriately  Outcome: Progressing  Goal: Establishment of infant feeding pattern  Description:  Postpartum OB-Pregnancy care plan goal which identifies if the mother is establishing a feeding pattern with their   Outcome: Progressing  Goal: Incisions, wounds, or drain sites healing without S/S of infection  Outcome: Progressing     Problem: Pain  Goal: Verbalizes/displays adequate comfort level or baseline comfort level  Outcome: Progressing     Problem: Infection - Adult  Goal: Absence of infection at discharge  Outcome: Progressing  Goal: Absence of infection during hospitalization  Outcome: Progressing  Goal: Absence of fever/infection during anticipated neutropenic period  Outcome: Progressing     Problem: Safety - Adult  Goal: Free from fall injury  Outcome: Progressing     Problem: Discharge Planning  Goal: Discharge to home or other facility with appropriate resources  Outcome: Progressing     
  Problem: ABCDS Injury Assessment  Goal: Absence of physical injury  Outcome: Progressing     Problem: Vaginal Birth or  Section  Goal: Fetal and maternal status remain reassuring during the birth process  Description:  Birth OB-Pregnancy care plan goal which identifies if the fetal and maternal status remain reassuring during the birth process  Outcome: Progressing     Problem: Postpartum  Goal: Experiences normal postpartum course  Description:  Postpartum OB-Pregnancy care plan goal which identifies if the mother is experiencing a normal postpartum course  Outcome: Progressing  Goal: Appropriate maternal -  bonding  Description:  Postpartum OB-Pregnancy care plan goal which identifies if the mother and  are bonding appropriately  Outcome: Progressing  Goal: Establishment of infant feeding pattern  Description:  Postpartum OB-Pregnancy care plan goal which identifies if the mother is establishing a feeding pattern with their   Outcome: Progressing  Goal: Incisions, wounds, or drain sites healing without S/S of infection  Outcome: Progressing     Problem: Pain  Goal: Verbalizes/displays adequate comfort level or baseline comfort level  Outcome: Progressing  Flowsheets (Taken 3/18/2025 [id number] by Leanne Schwarz RN)  Verbalizes/displays adequate comfort level or baseline comfort level:   Encourage patient to monitor pain and request assistance   Assess pain using appropriate pain scale     Problem: Infection - Adult  Goal: Absence of infection at discharge  Outcome: Progressing  Goal: Absence of infection during hospitalization  Outcome: Progressing  Goal: Absence of fever/infection during anticipated neutropenic period  Outcome: Progressing     Problem: Safety - Adult  Goal: Free from fall injury  Outcome: Progressing     Problem: Discharge Planning  Goal: Discharge to home or other facility with appropriate resources  Outcome: Progressing     Problem: Chronic Conditions and 
Adult  Goal: Absence of infection at discharge  3/17/2025 2021 by Sheryl Sparrow RN  Outcome: Progressing  3/17/2025 1537 by Rina Cook RN  Outcome: Progressing  Goal: Absence of infection during hospitalization  3/17/2025 2021 by Sheryl Sparrow RN  Outcome: Progressing  3/17/2025 1537 by Rina Cook RN  Outcome: Progressing  Goal: Absence of fever/infection during anticipated neutropenic period  3/17/2025 2021 by Sheryl Sparrow RN  Outcome: Progressing  3/17/2025 1537 by Rina Cook RN  Outcome: Progressing     Problem: Safety - Adult  Goal: Free from fall injury  3/17/2025 2021 by Sheryl Sparrow RN  Outcome: Progressing  3/17/2025 1537 by Rina Cook RN  Outcome: Progressing     Problem: Discharge Planning  Goal: Discharge to home or other facility with appropriate resources  3/17/2025 2021 by Sheryl Sparrow RN  Outcome: Progressing  3/17/2025 1537 by Rina Cook RN  Outcome: Progressing     Problem: Chronic Conditions and Co-morbidities  Goal: Patient's chronic conditions and co-morbidity symptoms are monitored and maintained or improved  3/17/2025 2021 by Sheryl Sparrow RN  Outcome: Progressing  3/17/2025 1537 by Rina Cook RN  Outcome: Progressing     
Co-morbidities  Goal: Patient's chronic conditions and co-morbidity symptoms are monitored and maintained or improved  Outcome: Progressing     Problem: Alteration in the Breast  Goal: Optimize infant feeding at the breast  Description: INTERVENTIONS:  1. Breast and nipple assessment  2. Assess prior breast feeding history  3. Hand expression of breast milk  4. Mechanical pumping  5. Nipple Shield  6. Supplemental formula feeding (LIP order)  7. Supplemental feeding system/device  8. For cracked, bleeding and or sore nipples reassess latch, treat damaged nipple  Outcome: Progressing     Problem: Inadequate Latch, Suck, or Swallow  Goal: Demonstrate ability to latch and sustain latch, audible swallowing and satiety  Description: INTERVENTIONS:  1.  Assess oral anatomy, notify LIP for abnormal findings  2.  Hand expression  3.  Maximize feeding opportunity (skin to skin, behavioral state)  4.  Positioning techniques  5.  Discourage use of pacifier-artificial nipple  6.  Educate mother on feeding cues  Outcome: Progressing

## 2025-03-25 ENCOUNTER — OFFICE VISIT (OUTPATIENT)
Dept: OBGYN | Age: 20
End: 2025-03-25

## 2025-03-25 VITALS
HEART RATE: 79 BPM | HEIGHT: 61 IN | SYSTOLIC BLOOD PRESSURE: 132 MMHG | BODY MASS INDEX: 41.54 KG/M2 | DIASTOLIC BLOOD PRESSURE: 92 MMHG | WEIGHT: 220 LBS

## 2025-03-25 DIAGNOSIS — Z98.891 STATUS POST CESAREAN DELIVERY: ICD-10-CM

## 2025-03-25 DIAGNOSIS — Z09 POSTOPERATIVE EXAMINATION: Primary | ICD-10-CM

## 2025-03-25 DIAGNOSIS — N92.6 IRREGULAR MENSES: ICD-10-CM

## 2025-03-25 DIAGNOSIS — G89.18 POSTOPERATIVE PAIN: ICD-10-CM

## 2025-03-25 PROCEDURE — 99024 POSTOP FOLLOW-UP VISIT: CPT | Performed by: STUDENT IN AN ORGANIZED HEALTH CARE EDUCATION/TRAINING PROGRAM

## 2025-03-25 RX ORDER — ACETAMINOPHEN 500 MG
1000 TABLET ORAL EVERY 6 HOURS PRN
Qty: 120 TABLET | Refills: 0 | Status: SHIPPED | OUTPATIENT
Start: 2025-03-25

## 2025-03-25 RX ORDER — OXYCODONE HYDROCHLORIDE 5 MG/1
5 TABLET ORAL EVERY 6 HOURS PRN
Qty: 15 TABLET | Refills: 0 | Status: SHIPPED | OUTPATIENT
Start: 2025-03-25 | End: 2025-03-28

## 2025-03-25 NOTE — PROGRESS NOTES
Department of Obstetrics and Gynecology  Postpartum Office Visit  Name:  Warren Briscoe   CSN: [id number]    : 2005   Age: 19 y.o.        EDC: Estimated Date of Delivery: 25     Chief Complaint   Patient presents with    Post-Op Check     Reports for post op check,  3/17. Pt is breast feeding.        Warren Briscoe is a 19 y.o.  that presents at 1 week for exam following PLTCS due to breech fetal presentation without complications.    SUBJECTIVE: Pt reports pain over spinal site and over incision. Has been taking ibuprofen. Pt denies headache, vision changes, right upper quadrant pain, edema. Lochia mild. Breast feeding bottle feeding. Denies depression, anxiety. Sleeping appropriately; +support system at home. Pregnancy complications include breech fetal presentation, anxiety/depression, obesity.     GYN HX:    Cervical cancer screening:  Has not had, under 21  No history of abnormal pap smears.    Patient's medications, allergies, past medical, surgical, social and family histories were reviewed and updated as appropriate.    ROS:  Constitutional: negative for fever, or chills  Respiratory: negative for cough, wheezing, SOB  Cardiovascular: negative for chest pain, palpitations, chest pressure/discomfort  Gastrointestinal: negative for nausea, vomiting, constipation, diarrhea, abdominal pain, change in bowel habits  Genitourinary: negative for dysuria, hematuria, trouble voiding, or incontinence; negative for vaginal discharge, itching, odor, or lesions  Breast: negative for breast lump, breast tenderness, nipple discharge, rash and skin color change  Musculoskeletal: negative for joint swelling or pain  Neurological: negative for headaches or dizziness  Behavioral/Psych: negative for depression/anxiety    OBJECTIVE:   BP (!) 132/92 (BP Site: Right Upper Arm, Patient Position: Sitting, BP Cuff Size: Large Adult)   Pulse 79   Ht 1.549 m (5' 1\")   Wt 99.8 kg (220 lb)   LMP

## 2025-04-08 ENCOUNTER — OFFICE VISIT (OUTPATIENT)
Dept: OBGYN | Age: 20
End: 2025-04-08

## 2025-04-08 VITALS
DIASTOLIC BLOOD PRESSURE: 75 MMHG | BODY MASS INDEX: 39.65 KG/M2 | HEART RATE: 69 BPM | WEIGHT: 210 LBS | HEIGHT: 61 IN | SYSTOLIC BLOOD PRESSURE: 111 MMHG

## 2025-04-08 DIAGNOSIS — Z98.891 STATUS POST CESAREAN DELIVERY: ICD-10-CM

## 2025-04-08 DIAGNOSIS — N92.6 IRREGULAR MENSES: ICD-10-CM

## 2025-04-08 DIAGNOSIS — Z09 POSTOPERATIVE EXAMINATION: Primary | ICD-10-CM

## 2025-04-08 DIAGNOSIS — G89.18 POSTOPERATIVE PAIN: ICD-10-CM

## 2025-04-08 PROCEDURE — 99024 POSTOP FOLLOW-UP VISIT: CPT | Performed by: STUDENT IN AN ORGANIZED HEALTH CARE EDUCATION/TRAINING PROGRAM

## 2025-04-08 NOTE — PROGRESS NOTES
Cuff Size: Large Adult)   Pulse 69   Ht 1.549 m (5' 1\")   Wt 95.3 kg (210 lb)   LMP 2024   Breastfeeding Yes   BMI 39.68 kg/m²     Constitutional: healthy appearing, well nourished, well developed.  Psychiatric: oriented to time/place/person, active and alert, normal mood/affect  Skin: no rashes, no lesions  Abdomen: soft, no distention, no tenderness, no masses, no CVA tenderness, incision clean, dry and intact, steri strips in place, no erythema or drainage  Hernia: no hernia palpated  Musculoskeletal: no calf tenderness, no significant edema    Labs:  Reviewed in chart.       ASSESSMENT/PLAN:    Diagnosis Orders   1. Postoperative examination        2. Status post  delivery        3. Postoperative pain        4. Irregular menses            NNadeen Briscoe is a 19 y.o.  at 3 weeks s/p PLTCS due to breech presentation and SROM  - Doing well postpartum. Discussed removing steri strips at home in shower.  - Cervical Cancer Screening - Pap at age 21    2. Postop pain   -3w postop   -Has been taking only ibuprofen, reports this was only rx she was given after. Will send short course of oxy and also send tylenol. Discussed rotation of tylenol and ibuprofen with oxy as needed.   3. Mild blood pressure    -Mild range at last visit, today normotensive, 111/75  4.Irregular menses   -Discussed nexplanon and IUD as she does not want POP. She is breast feeding. Desires IUD. Plan for placement at 6w PP.     Electronically signed by: Meryl Arceo DO 2025

## 2025-04-21 ENCOUNTER — TELEPHONE (OUTPATIENT)
Dept: ONCOLOGY | Age: 20
End: 2025-04-21

## 2025-04-21 NOTE — TELEPHONE ENCOUNTER
Called patient to reschedule their no show appointment on 4/21/2025 @ 1:45 with Dr. Lopez.  Left message for patient to return call to reschedule appointment.  Sending a message for  to send out a NO SHOW letter.

## (undated) DEVICE — SUTURE MONOCRYL SZ 0 L36IN ABSRB VLT L48MM CTX 1/2 CIR Y398H

## (undated) DEVICE — GLOVE SURG SZ 65 THK91MIL LTX FREE SYN POLYISOPRENE

## (undated) DEVICE — SKIN PREP TRAY 4 COMPARTM TRAY: Brand: MEDLINE INDUSTRIES, INC.

## (undated) DEVICE — STRIP,CLOSURE,WOUND,MEDI-STRIP,1/2X4: Brand: MEDLINE

## (undated) DEVICE — APPLICATOR MEDICATED 26 CC SOLUTION HI LT ORNG CHLORAPREP

## (undated) DEVICE — SUTURE VICRYL SZ 0 L36IN ABSRB VLT L36MM CT-1 1/2 CIR J346H

## (undated) DEVICE — BABY BLANKET KIT: Brand: MEDLINE INDUSTRIES, INC.

## (undated) DEVICE — 3M™ STERI-STRIP™ COMPOUND BENZOIN TINCTURE 40 BAGS/CARTON 4 CARTONS/CASE C1544: Brand: 3M™ STERI-STRIP™

## (undated) DEVICE — GOWN,SIRUS,POLYRNF,BRTHSLV,LG,30/CS: Brand: MEDLINE

## (undated) DEVICE — GARMENT,MEDLINE,DVT,INT,CALF,MED, GEN2: Brand: MEDLINE

## (undated) DEVICE — WOUND RETRACTOR AND PROTECTOR: Brand: ALEXIS WOUND PROTECTOR-RETRACTOR

## (undated) DEVICE — GOWN,ECLIPSE,POLYRNF,BRTHSLV,L,30/CS: Brand: MEDLINE

## (undated) DEVICE — TOWEL,OR,DSP,ST,BLUE,STD,6/PK,12PK/CS: Brand: MEDLINE

## (undated) DEVICE — LARGE, DISPOSABLE ALEXIS O C-SECTION PROTECTOR - RETRACTOR: Brand: ALEXIS ® O C-SECTION PROTECTOR - RETRACTOR

## (undated) DEVICE — CONTAINER SPEC 172OZ MP STOR

## (undated) DEVICE — SUTURE MONOCRYL SZ 3-0 L27IN ABSRB UD L60MM KS STR REV CUT Y523H

## (undated) DEVICE — GLOVE SURG SZ 6 THK91MIL LTX FREE SYN POLYISOPRENE ANTI

## (undated) DEVICE — 1LYRTR 16FR10ML 100%SILI SNAP: Brand: MEDLINE INDUSTRIES, INC.

## (undated) DEVICE — DRESSING TRNSPAR W8XL12IN FLM SURESITE 123

## (undated) DEVICE — MATTRESS MAXI AIR TRNSF SGL PT USE DCS 37 45 48 52 75

## (undated) DEVICE — SPONGE GZ W4XL8IN COT WVN 12 PLY

## (undated) DEVICE — CLEANER,CAUTERY TIP,2X2",STERILE: Brand: MEDLINE

## (undated) DEVICE — PENCIL SMK EVAC 15FT BLADE ELECTRD ROCKER F/TELSCP

## (undated) DEVICE — ELECTRODE ES AD CRDLSS PT RET REM POLYHESIVE

## (undated) DEVICE — STRIP SKIN CLSR W1XL5IN NYLON REINF CURAD STERIL

## (undated) DEVICE — DRAPE SHEET ULTRAGARD: Brand: MEDLINE

## (undated) DEVICE — SINGLE PORT MANIFOLD: Brand: NEPTUNE 2

## (undated) DEVICE — SENSOR PLSE OXMTR AD CBL L3FT ADH TRANSMISSIVE

## (undated) DEVICE — BLOOD TRANSFER DEVICE: Brand: MEDLINE

## (undated) DEVICE — TRAXI PANNICULUS RETRACTOR WITH RETENTUS TECHNOLOGY (BMI 30-50): Brand: TRAXI® PANNICULUS RETRACTOR

## (undated) DEVICE — Device